# Patient Record
Sex: FEMALE | URBAN - METROPOLITAN AREA
[De-identification: names, ages, dates, MRNs, and addresses within clinical notes are randomized per-mention and may not be internally consistent; named-entity substitution may affect disease eponyms.]

---

## 2022-06-06 ENCOUNTER — HOSPITAL ENCOUNTER (OUTPATIENT)
Dept: PHYSICAL THERAPY | Age: 62
Setting detail: RECURRING SERIES
Discharge: HOME | End: 2022-06-09
Payer: COMMERCIAL

## 2022-06-06 PROCEDURE — 97530 THERAPEUTIC ACTIVITIES: CPT

## 2022-06-06 PROCEDURE — 97162 PT EVAL MOD COMPLEX 30 MIN: CPT

## 2022-06-06 NOTE — PROGRESS NOTES
Samantha Lopez  : 1960  Primary: [facility]  Secondary:  Karin Pedroza [address] [address]  Phone: [phone]  Fax: [phone] Plan Frequency: weekly    Plan of Care/Certification Expiration Date: 22      PT Visit Info: Total # of Visits to Date: 1  Progress Note Due Date: 22 (or 10th visit)      OUTPATIENT PHYSICAL THERAPY:OP NOTE TYPE: Treatment Note 2022       Episode  }Appt Desk              Treatment Diagnosis:   Treatment Diagnosis:  Lack of coordination (muscle incoordination) (R27.8)  Pelvic floor dysfunction in female (M62.98)  Urge incontinence (N39.41)  Muscle spasm (M62.83)  Medical/Referring Diagnosis:  PFD (pelvic floor dysfunction) [S79.88]  Referring Physician:  Margueritte Boas, MD MD Orders:  PT Eval and Treat   Date of Onset:  No data recorded   Allergies:   Patient has no allergy information on record. Restrictions/Precautions:  No data recordedNo data recorded   Interventions Planned (Treatment may consist of any combination of the following):    Current Treatment Recommendations: Strengthening; ROM; Neuromuscular re-education; Manual Therapy - Soft Tissue Mobilization; Pain management; Home exercise program; Therapeutic activities     Subjective Comments:  1. Bladder spasms 2. Urge incontinence 3.  Stress Incontinence  Initial:}     010Post Session: 0/10       /10  Medications Last Reviewed:  2022  Updated Objective Findings:  See evaluation note from today  Treatment   THERAPEUTIC EXERCISE: (0 minutes):      THERAPEUTIC ACTIVITY: (25 minutes): Functional activity education regarding anatomy, pathology and role of pelvic floor muscle (PFM) function in relation to presenting symptoms and role of pelvic floor therapy in conservative treatment., Functional activity education on avoiding bladder irritants, substitutions for common irritants, recommended fluid intake (types and spacing), appropriate voiding frequency, weight management and overall contributing factors of bowel health on bladder health/function in order to improve independent self management. Patient was provided a list of common bladder irritants including caffeine, carbonation, alcohol, artificial sweeteners, chocolate, acidic drinks, and tomato based drinks. and Functional activity training on role and use of urge suppression in order to delay voiding, minimize urge urinary incontinence and improve control in the presence of urge triggers (ie. running water, key in lock, cold, etc.)    TA Educational Topic Notes Date Completed   Pathology/Anatomy/PFM Function Completed 6/6/22   Bladder health education Completed 6/6/22   Urinary urge suppression Completed 6/6/22   The spencer     Voiding strategies     Nocturia tips     Bowel/Bladder log     Bowel health education     Constipation care     Diarrhea/Fecal leakage     Colonic massage     Toilet positioning     Defecation dynamics     Sources of fiber     Return to intercourse/Dilator training     Sexual positioning     Lubricants/vaginal moisturizers     Vaginal irritants     Body mechanics     Posture/ergonomics     Diaphragmatic breathing     Resources and technology         Treatment/Session Summary:    · Treatment Assessment:  Pt verbalized understanding with completion of HEP. · Communication/Consultation:  None today  · Equipment provided today:  None  · Recommendations/Intent for next treatment session: Next visit will focus on hip strength screen, manual therapy to PFM, sEMG biofeedback for urge suppression training.     Total Treatment Billable Duration:  60 minutes   Time In: 0100  Time Out: 0200    Anjum Bingham PT       Charge Capture  }Post Session Pain  MedBridge Portal  MD Guidelines  Scanned Media  Benefits  MyChart    Future Appointments   Date Time Provider Aaron Lees   6/20/2022 10:00 AM Anjum Bingham, [address] [facility]   7/1/2022 10:00 AM Anjum Bingham, PT [facility]   7/12/2022 10:00 AM Leonard Romero [facility]   7/22/2022 10:00 AM Martinez leger PT [facility]   7/29/2022 10:00 AM GEO Romero

## 2022-06-06 NOTE — THERAPY EVALUATION
Waldo Wren  : 1960  Primary: Lacy De La Cruz  Secondary:  Beatriz Macdonald [facility] [address] [place] [address] Way [address]  Phone: [phone]  Fax: [phone] Plan Frequency: weekly    Plan of Care/Certification Expiration Date: 22      PT Visit Info: Total # of Visits to Date: 1      OUTPATIENT PHYSICAL THERAPY:OP NOTE TYPE: Initial Assessment 2022               Episode  Appt Desk         Treatment Diagnosis:  Lack of coordination (muscle incoordination) (R27.8)  Pelvic floor dysfunction in female (M62.98)  Urge incontinence (N39.41)  Muscle spasm (M62.83)  * No diagnoses found *  Medical/Referring Diagnosis:  PFD (pelvic floor dysfunction) [L66.23]  Referring Physician:  Aaron Berumen MD MD Orders:  PT Eval and Treat   Return MD Appt:  2022  Date of Onset:  No data recorded   Allergies:  Patient has no allergy information on record. Restrictions/Precautions:    No data recordedNo data recorded   Medications Last Reviewed:  2022     SUBJECTIVE   History of Injury/Illness (Reason for Referral):  Ms. Alok Kim is a 59 yo female referred to pelvic floor physical therapy (PFPT) by Aaron Berumen MD 2/2 PFD (pelvic floor dysfunction) [M62.89] Pt reports that symptoms began 2 years ago. She has had HAY for several years, but bladder spasms have been more recent. They are painful and last 30-45 seconds. States the spasms return until she empties her bladder. She has been taking Vesicare since the fall which has helped. She has incontinence with sudden urge. This occurs with transitioning from OR to restroom. Frequent UTI's (2-3x/year) since menopause. She has not had a UTI in 1 year. She has not had a  Recent cystoscopy. Her last cystoscopy was 8 years ago without evident findings. 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     She is also taking metformin - She has loose stool. She is not sexually active. L/5 discectomy. Has felt sensory loss following. Urinary: Frequency 9-12 x/day, 2 x/night. She is trying to go every 2 hours. Positive for urge incontinence> stress incontinence. She used to experience bladder spasms all day. They are less frequent with medication. She has some relief with urination. Denies difficulty emptying her bladder. Pt does use pads for urinary protection; 2 pads per day (PPD). Fluid intake: 2 16 oz glasses of water/day; bladder irritants include: decaf coffee, white wine Pt does limit fluid intake due to bladder control while at work. Bowel: Frequency daily  Positive for diarrhea with medication. Negative for pain with bowel movement and pushing/straining with bowel movement. Pt does not use pads for bowel protection; 0 pads per day (PPD). Sexual: Pt is not sexually active. Her  is impotent. Pelvic Organ Prolapse/Pelvic Pain: Denies pelvic pain. OB History: Number of pregnancies: Number of vaginal deliveries:  0Number of c-sections: 1 (twins)    Hemorrhoidectomy and fissure repair     Patient Stated Goal(s):  Minimize UI and bladder spasms. Initial:      /10 Post Session:      /10  Past Medical History/Comorbidities:   Ms. Macrina Padgett  has no past medical history on file. Ms. Macrina Padgett  has no past surgical history on file. Social History/Living Environment:   Lives With: Spouse     Prior Level of Function/Work/Activity:   No data recordedNo data recordedNo data recorded   Learning:   Does the patient/guardian have any barriers to learning?: No barriers     Fall Risk Scale: Total Score: 0  Leach Fall Risk: Low (0-24)        Previous Treatment Approaches:  OAB medication    Personal Factors:        Age:  58 y.o. Profession:  Works in [employer]. Pt working 4 ten hour shifts per week. She walks for exercises at work. She is working on weight loss by counting calories.  She recently had a knee injection (seeing Dr. Fabricio Trimble)      OBJECTIVE External Observations:   Voluntary contraction: [] absent     [x] present   Involuntary contraction: [] absent     [x] present   Involuntary relaxation: [] absent     [x] present   Perineal descent at rest: [x] absent     [] present   Perineal descent with bearing: [] absent     [x] present   Postural assessment: Forward Head Posture and Increased Thoracic Kyphosis   Gait: Ambulating with cane. Pelvic Floor Muscle (PFM) Assessment:   Vaginal vault size: [] decreased     [] increased     [x] WNL   Muscle volume: [] decreased     [] WNL     [x] Defect   PFM tension: [] decreased     [x] increased     [] WNL    Contraction ability:  Voluntary contraction: [] absent     [x] weak     [] moderate      [] strong  Voluntary relaxation: [] absent     [x] partial     [] complete   MMT: 3/5   Number of quick contractions in 10 seconds: 5  Quality of contractions: Fair to good  Overflow: [] absent     [] min     [] mod     [] severe / Compensatory mm groups include breath holding    Palpation: via vaginal canal   Superficial Pelvic Floor Musculature (PFM): Tender? Intermediate PFM Tender? Deep PFM Tender? Superficial Transverse Perineal [x] Right  [x] Left  []DNT Deep Transverse Perineal [] Right  [] Left  []DNT Puborectalis [] Right  [] Left  []DNT   Ischiocavernosus [x] Right  [x] Left  []DNT  Referral to bladder Compressor Urethra [x] Right  [x] Left  []DNT  Referral to bladder Pubococcygeus [x] Right  [x] Left  []DNT   Bulbocavernosus [x] Right  [x] Left  []DNT   Iliococcygeus [x] Right  [x] Left  []DNT       Obturator Internus [] Right  [] Left  []DNT       Coccygeus [] Right  [] Left  []DNT     Strength: LE To be assessed. Range of motion:   Left Right   Hip flexion     Hip extension     Knee flexion      Knee extension     Hip internal rotation      Hip external rotation      Hip abduction     Hip adduction          External palpation:  Muscle/muscle group Tender?    Adductors [] Right  [] Left []DNT   Gluteals [] Right  [] Left  []DNT   Piriformis [] Right  [] Left  []DNT   Iliopsoas [] Right  [] Left  []DNT   Abdominal wall [x] Right  [x] Left  []DNT   Pubic symphysis [] Right  [] Left  []DNT     Breath assessment:  Observation: chest breathing dominant  Coordination of pelvic floor muscles with breath: no pelvic floor muscle excursion  Co-contraction of PFM with transversus abdominis: present    Diastasis Recti    At umbilicus Present   1 inch above umbilicus    1 inch below umbilicus    TA contraction        ASSESSMENT   Initial Assessment:  Emil Pink presents with musculoskeletal limitations including pelvic floor muscle (PFM) tension, reduced PFM Range of Motion (ROM), increased tenderness to palpation of the PFM, altered body mechanics, reduced coordination and awareness of PFM and decreased pelvic floor muscle (PFM) strength. These limitations are impairing the patient's ability to properly coordinate perineal elevation and descent for normalized PFM function, contributing to urinary dysfunction. As a result, she is limited in her/his ability to participate in physical activities such as walking, swimming, or other exercise, traveling by car or bus for a distance greater then 30 minutes away from home and perform job. Problem List: (Impacting functional limitations): Body Structures, Functions, Activity Limitations Requiring Skilled Therapeutic Intervention: Decreased ROM; Decreased body mechanics; Decreased tolerance to work activity; Decreased strength;  Increased pain     Therapy Prognosis:   Therapy Prognosis: Good; Fair     Assessment Complexity:   Medium Complexity  PLAN   Effective Dates: 09/04/22 TO Plan of Care/Certification Expiration Date: 09/04/22     Frequency/Duration: Plan Frequency: weekly     Interventions Planned (Treatment may consist of any combination of the following):    Current Treatment Recommendations: Strengthening; ROM; Neuromuscular re-education; Manual Therapy - Soft Tissue Mobilization; Pain management; Home exercise program; Therapeutic activities     Goals: (Goals have been discussed and agreed upon with patient.)  Short-Term Functional Goals: Time Frame: 3-4 weeks  Pt will demonstrate I with basic PFM HEP to improve awareness, coordination, and timing of PFM. Patient will demonstrate understanding of and ability to teach back appropriate water intake, bladder irritants, toileting frequency, and positioning for improved self-management of symptoms. Patient will demonstrate ability to isolate a pelvic floor contraction without breath holding and minimal to no accessory muscle use in order to implement the knack and/or urge suppression, reducing pad usage by 1/2. Patient will demonstrate appropriate use of the pelvic floor muscle group (quick flicks and/or drops), without compensation, to implement urge suppression appropriately with urgency of urination and decrease the number of pads per day or UI episodes by 1/2. Patient will demonstrate appropriate co-contraction of the transversus abdominis and pelvic floor muscle group during exhalation in order to reduce IAP and improve functional task performance including lifting, bending, transitioning. Discharge Goals: Time Frame: 4-8 weeks  Patient will demonstrate ability to voluntarily contract the pelvic floor muscles prior to a cough or sneeze for decreased leakage during increases in intra-abdominal pressure. Patient will demonstrate independence with an advanced HEP for general conditioning, core stabilization, and mobility to facilitate carry over and independent management of symptoms. Patient will be independent with implementation of a timed voiding schedule and use of urge suppression to reduce urinary frequency to 8-10/day and 1-2/night.            Outcome Measure:   PFIQ: 24/100    Medical Necessity:   Patient is expected to demonstrate progress in strength, range of motion and coordination to improve urinary control. Skilled intervention continues to be required due to the above mentioned deficits. Reason For Services/Other Comments:  Patient continues to require skilled intervention due to the above mentioned deficits. Total Duration:   60 minutes    Regarding James Peace's therapy, I certify that the treatment plan above will be carried out by a therapist or under their direction.   Thank you for this referral,  Salli Brittle, PT     Referring Physician Signature: Margueritte Boas, MD _______________________________ Date _____________        Post Session Pain  Charge Capture   POC Link  Treatment Note Link  MD Guidelines  MyChart

## 2022-06-20 ENCOUNTER — HOSPITAL ENCOUNTER (OUTPATIENT)
Dept: PHYSICAL THERAPY | Age: 62
Setting detail: RECURRING SERIES
Discharge: HOME | End: 2022-06-23
Payer: COMMERCIAL

## 2022-06-20 PROCEDURE — 97530 THERAPEUTIC ACTIVITIES: CPT

## 2022-06-20 PROCEDURE — 97140 MANUAL THERAPY 1/> REGIONS: CPT

## 2022-06-20 NOTE — PROGRESS NOTES
Aram Chi  : 1960  Primary:   Secondary:  Domo Garcia  [address] [facility] [address]  Phone: [phone]  Fax: [phone] Plan Frequency: weekly    Plan of Care/Certification Expiration Date: 22      PT Visit Info: Total # of Visits to Date: 2  Progress Note Due Date: 22 (or 10th visit)      OUTPATIENT PHYSICAL THERAPY:OP NOTE TYPE: Treatment Note 2022       Episode  }Appt Desk              Treatment Diagnosis:  Lack of coordination (muscle incoordination) (R27.8)  Pelvic floor dysfunction in female (M62.98)  Urge incontinence (N39.41)  Muscle spasm (M62.83)  Medical/Referring Diagnosis:  PFD (pelvic floor dysfunction) [E08.40]  Referring Physician:  Roland Mcgovern MD MD Orders:  PT Eval and Treat   Date of Onset:  No data recorded   Allergies:   Patient has no allergy information on record. Restrictions/Precautions:  No data recordedNo data recorded   Interventions Planned (Treatment may consist of any combination of the following):    Current Treatment Recommendations: Strengthening; ROM; Neuromuscular re-education; Manual Therapy - Soft Tissue Mobilization; Pain management; Home exercise program; Therapeutic activities     Subjective Comments:  Pt is trying to figure out a OAB medication that will be covered by her insurance. Taking vesicare currently. She states bladder spasms are less. She is uanable to make it to the restroom in time when she does have to urinate. Initial:}     0/10Post Session: 0/10       0/10  Medications Last Reviewed:  2022  Updated Objective Findings:    Limited right  Hip internal rotation due to pain present. Right hip external rotation range of motion limited. Unable to perform MMT due to pain present. Left hip ROM also limited. Left hip ER 3+/5. Unable to test internal rotation due to pain. PFM overactivity present.  Intermittent paradoxical PFM contraction present, mild difficulty coordinating quick flicks. Treatment   THERAPEUTIC EXERCISE: (0 minutes):      THERAPEUTIC ACTIVITY: (25 minutes): Functional activity education regarding anatomy, pathology and role of pelvic floor muscle (PFM) function in relation to presenting symptoms and role of pelvic floor therapy in conservative treatment., Functional activity education on avoiding bladder irritants, substitutions for common irritants, recommended fluid intake (types and spacing), appropriate voiding frequency, weight management and overall contributing factors of bowel health on bladder health/function in order to improve independent self management. Patient was provided a list of common bladder irritants including caffeine, carbonation, alcohol, artificial sweeteners, chocolate, acidic drinks, and tomato based drinks. and Functional activity training on role and use of urge suppression in order to delay voiding, minimize urge urinary incontinence and improve control in the presence of urge triggers (ie. running water, key in lock, cold, etc.)    TA Educational Topic Notes Date Completed   Pathology/Anatomy/PFM Function Completed 6/6/22   Bladder health education Completed 6/6/22   Urinary urge suppression Completed  Reviewed - provided handout  Updated HEP to include PF drops and quick flicks 4/5/08 7/83/62   The knack     Voiding strategies     Nocturia tips     Bowel/Bladder log     Bowel health education     Constipation care     Diarrhea/Fecal leakage     Colonic massage     Toilet positioning     Defecation dynamics     Sources of fiber     Return to intercourse/Dilator training     Sexual positioning     Lubricants/vaginal moisturizers     Vaginal irritants     Body mechanics     Posture/ergonomics     Diaphragmatic breathing Reviewed, drops with digital cues 6/20/22   Resources and technology       MANUAL THERAPY: (30 minutes):    Soft tissue mobilization was utilized and necessary because of the patient's restricted motion of soft tissue. Date Type Location Comments   6/20/2022 Internal assessment/treatment Via vaginal canal SP, digital sweeping, and C/R - applied to superficial, intermediate, and deep layers of PFM                                       (Used abbreviations: MET - muscle energy technique; SCS- Strain counter strain; CTM-Connective tissue mobilizations; CR- Contract/Relax; SP- Sustained pressure; PIT- Positional inhibition techniques; STM Soft -tissue mobilization; MM- Myofascial mobilization; TrP-Trigger point release; IASTM- Instrument assisted soft tissue mobilizations, TDN-Trigger point dry needling)    Pt gives verbal consent to internal vaginal assessment/treatment without chaperon present. Treatment/Session Summary:    · Treatment Assessment:  Pt continues to present with excessive PFM tension contributing to increased bladder pressure and discomfort. This is also likely limiting her ability to perform quick flick PFM contractions as needed for urge supression. Verbal cues provided for appropriate coordination of PFM during contraction due to tendency to inhale and kegal.  · Communication/Consultation:  None today  · Equipment provided today:  None  · Recommendations/Intent for next treatment session: Next visit will focus on hip strengthening (initiate with isometrics into abduction), continue manual therapy to PFM, sEMG biofeedback for urge suppression training.     Total Treatment Billable Duration:  60 minutes   Time In: 1005  Time Out: [address] [facility], PT       Charge Capture  }Post Session Pain  MedBridge Portal  MD Guidelines  Scanned Media  Benefits  MyChart    Future Appointments   Date Time Provider Aaron Lees   7/1/2022 10:00 AM Martinez leger PT [facility]   7/12/2022 10:00 AM Martinez leger PT [facility]   7/22/2022 10:00 AM Martinez leger PT [facility]   7/29/2022 10:00 AM GEO Romero

## 2022-07-01 ENCOUNTER — HOSPITAL ENCOUNTER (OUTPATIENT)
Dept: PHYSICAL THERAPY | Age: 62
Setting detail: RECURRING SERIES
Discharge: HOME | End: 2022-07-04
Payer: COMMERCIAL

## 2022-07-01 PROCEDURE — 97110 THERAPEUTIC EXERCISES: CPT

## 2022-07-01 PROCEDURE — 97140 MANUAL THERAPY 1/> REGIONS: CPT

## 2022-07-01 PROCEDURE — 97530 THERAPEUTIC ACTIVITIES: CPT

## 2022-07-01 NOTE — PROGRESS NOTES
of quick flicks and drops. PFM contraction: 3/5 followed by full relaxation    Treatment   THERAPEUTIC EXERCISE: (10 minutes):    Supine hip abduction - isometric x 5 sec hold x 10 reps (RTB)  Isometric hip adduction with ball squeeze - x 10, 2 sets      THERAPEUTIC ACTIVITY: (15 minutes): Functional activity education regarding anatomy, pathology and role of pelvic floor muscle (PFM) function in relation to presenting symptoms and role of pelvic floor therapy in conservative treatment., Functional activity education on avoiding bladder irritants, substitutions for common irritants, recommended fluid intake (types and spacing), appropriate voiding frequency, weight management and overall contributing factors of bowel health on bladder health/function in order to improve independent self management. Patient was provided a list of common bladder irritants including caffeine, carbonation, alcohol, artificial sweeteners, chocolate, acidic drinks, and tomato based drinks.  and Functional activity training on role and use of urge suppression in order to delay voiding, minimize urge urinary incontinence and improve control in the presence of urge triggers (ie. running water, key in lock, cold, etc.)    TA Educational Topic Notes Date Completed   Pathology/Anatomy/PFM Function Completed 6/6/22   Bladder health education Completed 6/6/22   Urinary urge suppression Completed  Reviewed - provided handout  Updated HEP to include PF drops and quick flicks  Reviewed 0/6/22 6/20/22 7/1/22   The knack     Voiding strategies     Nocturia tips     Bowel/Bladder log     Bowel health education     Constipation care     Diarrhea/Fecal leakage     Colonic massage     Toilet positioning     Defecation dynamics     Sources of fiber     Return to intercourse/Dilator training     Sexual positioning     Lubricants/vaginal moisturizers     Vaginal irritants     Body mechanics     Posture/ergonomics     Diaphragmatic breathing Reviewed, drops with digital cues 6/20/22   Resources and technology       MANUAL THERAPY: (30 minutes): Soft tissue mobilization was utilized and necessary because of the patient's restricted motion of soft tissue. Date Type Location Comments   7/1/2022 Internal assessment/treatment Via vaginal canal SP, digital sweeping, and C/R - applied to superficial, intermediate, and deep layers of PFM     adductors CTM through adductors                                 (Used abbreviations: MET - muscle energy technique; SCS- Strain counter strain; CTM-Connective tissue mobilizations; CR- Contract/Relax; SP- Sustained pressure; PIT- Positional inhibition techniques; STM Soft -tissue mobilization; MM- Myofascial mobilization; TrP-Trigger point release; IASTM- Instrument assisted soft tissue mobilizations, TDN-Trigger point dry needling)    Pt gives verbal consent to internal vaginal assessment/treatment without chaperon present. Treatment/Session Summary:    · Treatment Assessment: Pt showed improved coordination of PFM today. Pt able to demonstrate quick flicks and drops without verbal cues and is utilizing this for urge suppression during her workday. Initiated hip isometrics with light resistance. · Communication/Consultation:  None today  · Equipment provided today:  None  · Recommendations/Intent for next treatment session: Next visit will focus on hip strengthening (assess response to isometrics into abduction and adduction), continue manual therapy to PFM, sEMG biofeedback for urge suppression training.     Total Treatment Billable Duration:  55 minutes   Time In: 5117  Time Out: 600 [facility], PT       Charge Capture  }Post Session Pain  MedBridge Portal  MD Guidelines  Scanned Media  Benefits  MyChart    Future Appointments   Date Time Provider Aaron Lees   7/12/2022 10:00 AM Tin Frederick PT [facility]   7/22/2022 10:00 AM Tin Frederick PT [facility]   7/29/2022 10:00 AM Tin Frederick PT [facility]

## 2022-07-12 ENCOUNTER — HOSPITAL ENCOUNTER (OUTPATIENT)
Dept: PHYSICAL THERAPY | Age: 62
Setting detail: RECURRING SERIES
Discharge: HOME | End: 2022-07-15
Payer: COMMERCIAL

## 2022-07-12 PROCEDURE — 97140 MANUAL THERAPY 1/> REGIONS: CPT

## 2022-07-12 PROCEDURE — 97110 THERAPEUTIC EXERCISES: CPT

## 2022-07-12 NOTE — PROGRESS NOTES
Luanne Alvarez  : 1960  Primary:   Secondary:  Zigmund Mandaeism  [address] [facility] [address]  Phone: [phone]  Fax: [phone] Plan Frequency: weekly    Plan of Care/Certification Expiration Date: 22      PT Visit Info: Total # of Visits to Date: 4  Progress Note Due Date: 22 (or 10th visit)      OUTPATIENT PHYSICAL THERAPY:OP NOTE TYPE: Treatment Note 2022       Episode  }Appt Desk              Treatment Diagnosis:  Lack of coordination (muscle incoordination) (R27.8)  Pelvic floor dysfunction in female (M62.98)  Urge incontinence (N39.41)  Muscle spasm (M62.83)  Medical/Referring Diagnosis:  PFD (pelvic floor dysfunction) [G32.21]  Referring Physician:  Bushra Tim MD MD Orders:  PT Eval and Treat   Date of Onset:  No data recorded   Allergies:   Patient has no allergy information on record. Restrictions/Precautions:  No data recordedNo data recorded   Interventions Planned (Treatment may consist of any combination of the following):    Current Treatment Recommendations: Strengthening; ROM; Neuromuscular re-education; Manual Therapy - Soft Tissue Mobilization; Pain management; Home exercise program; Therapeutic activities     Subjective Comments:    Pt started new bladder medication today. She has her appointment with Dr. Leigha Arana. She was off of work for 5 days and then returned. She had an increase in bladder spasms for 3-4 days when she returned back to work. She admits to drinking wine over her days off. Pt reports with side-to-side transitional movement - out of car and transitioning off commode she has significant right hip pain. Initial:}     4/10Post Session: 0/10       0/10  Medications Last Reviewed:  2022  Updated Objective Findings:    Limited right  Hip internal rotation due to pain present. Right hip external rotation range of motion limited. Unable to perform MMT due to pain present.    Left hip ROM also limited. Left hip ER 3+/5. Unable to test internal rotation due to pain. PFM contraction: 3/5 followed by full relaxation    Treatment   THERAPEUTIC EXERCISE: (15 minutes):    Supine hip abduction - isometric x 5 sec hold x 20 reps (RTB)  Isometric hip adduction with ball squeeze - x 10, 2 sets  Bridge x 10      THERAPEUTIC ACTIVITY: ( minutes): Functional activity education regarding anatomy, pathology and role of pelvic floor muscle (PFM) function in relation to presenting symptoms and role of pelvic floor therapy in conservative treatment., Functional activity education on avoiding bladder irritants, substitutions for common irritants, recommended fluid intake (types and spacing), appropriate voiding frequency, weight management and overall contributing factors of bowel health on bladder health/function in order to improve independent self management. Patient was provided a list of common bladder irritants including caffeine, carbonation, alcohol, artificial sweeteners, chocolate, acidic drinks, and tomato based drinks.  and Functional activity training on role and use of urge suppression in order to delay voiding, minimize urge urinary incontinence and improve control in the presence of urge triggers (ie. running water, key in lock, cold, etc.)    TA Educational Topic Notes Date Completed   Pathology/Anatomy/PFM Function Completed 6/6/22   Bladder health education Completed 6/6/22   Urinary urge suppression Completed  Reviewed - provided handout  Updated HEP to include PF drops and quick flicks  Reviewed 7/5/89 6/20/22 7/1/22   The knack     Voiding strategies     Nocturia tips     Bowel/Bladder log     Bowel health education     Constipation care     Diarrhea/Fecal leakage     Colonic massage     Toilet positioning     Defecation dynamics     Sources of fiber     Return to intercourse/Dilator training     Sexual positioning     Lubricants/vaginal moisturizers     Vaginal irritants     Body mechanics Posture/ergonomics     Diaphragmatic breathing Reviewed, drops with digital cues 6/20/22   Resources and technology       MANUAL THERAPY: (40 minutes): Soft tissue mobilization was utilized and necessary because of the patient's restricted motion of soft tissue. Date Type Location Comments   7/12/2022 Internal assessment/treatment Via vaginal canal SP, digital sweeping, and C/R - applied to superficial, intermediate, and deep layers of PFM     adductors CTM through adductors      CTM through abdominal wall                           (Used abbreviations: MET - muscle energy technique; SCS- Strain counter strain; CTM-Connective tissue mobilizations; CR- Contract/Relax; SP- Sustained pressure; PIT- Positional inhibition techniques; STM Soft -tissue mobilization; MM- Myofascial mobilization; TrP-Trigger point release; IASTM- Instrument assisted soft tissue mobilizations, TDN-Trigger point dry needling)    Pt gives verbal consent to internal vaginal assessment/treatment without chaperon present. Treatment/Session Summary:    · Treatment Assessment: Pt continues to present with PFM overactivity. Trigger points present through suprapubic fascia. Mild discomfort present, but without referral to bladder. · Communication/Consultation:  None today  · Equipment provided today:  None  · Recommendations/Intent for next treatment session: Next visit will focus on hip strengthening (assess response to isometrics into abduction and adduction), continue manual therapy to PFM, sEMG biofeedback for urge suppression training.   · Variation from POC: Pt will be out of town June 29th - August 16th (in [place])    Total Treatment Billable Duration:  55 minutes   Time In: 1005  Time Out: 600 [facility], PT       Charge Capture  }Post Session Pain  MedBridge Portal  MD Guidelines  Scanned Media  Benefits  MyChart    Future Appointments   Date Time Provider Aaron Lees   7/22/2022 10:00 AM Brendon Hidalgo PT [facility] 7/29/2022  7:00 PM Ayla Tristan, PT [facility]

## 2022-07-22 ENCOUNTER — HOSPITAL ENCOUNTER (OUTPATIENT)
Dept: PHYSICAL THERAPY | Age: 62
Setting detail: RECURRING SERIES
Discharge: HOME | End: 2022-07-25
Payer: COMMERCIAL

## 2022-07-22 PROCEDURE — 97140 MANUAL THERAPY 1/> REGIONS: CPT

## 2022-07-22 PROCEDURE — 97110 THERAPEUTIC EXERCISES: CPT

## 2022-07-29 ENCOUNTER — HOSPITAL ENCOUNTER (OUTPATIENT)
Dept: PHYSICAL THERAPY | Age: 62
Setting detail: RECURRING SERIES
End: 2022-07-29
Payer: COMMERCIAL

## 2022-07-29 NOTE — PROGRESS NOTES
Israel Frazier  : 1960  Primary: Sandra Jones - Open Access (hmo)  Secondary:  [facility] [address] [address]  Phone: [phone]  Fax: [phone]    PT Visit Info: Total # of Visits to Date: 5  Progress Note Due Date: 22 (or 10th visit)     OT Visit Info:  No data recorded    OUTPATIENT THERAPY:OP NOTE TYPE: Progress Report 2022               Episode  Appt Desk           Israel Frazier cancelled her appointment for today due to unknown reasons. Will plan to follow up next during next appointment.   Thank you,  Tin Frederick, PT    Future Appointments   Date Time Provider Aaron Lees   2022  7:00 PM Tin Frederick, [facility]   8/15/2022 10:00 AM Tin Frederick PT [facility]   2022 10:00 AM Tin Frederick PT [facility]

## 2022-08-15 ENCOUNTER — HOSPITAL ENCOUNTER (OUTPATIENT)
Dept: PHYSICAL THERAPY | Age: 62
Setting detail: RECURRING SERIES
Discharge: HOME | End: 2022-08-18
Payer: COMMERCIAL

## 2022-08-15 PROCEDURE — 97140 MANUAL THERAPY 1/> REGIONS: CPT

## 2022-08-15 PROCEDURE — 97530 THERAPEUTIC ACTIVITIES: CPT

## 2022-08-15 NOTE — PROGRESS NOTES
Ramiro Bustillo  : 1960  Primary:   Secondary:  Rohit Peak  [address] [facility] [address]  Phone: [phone]  Fax: [phone] Plan Frequency: weekly    Plan of Care/Certification Expiration Date: 22      PT Visit Info: Total # of Visits to Date: 6  Progress Note Due Date: 22 (or 10th visit)      OUTPATIENT PHYSICAL THERAPY:OP NOTE TYPE: Treatment Note 8/15/2022       Episode  }Appt Desk              Treatment Diagnosis:  Lack of coordination (muscle incoordination) (R27.8)  Pelvic floor dysfunction in female (M62.98)  Urge incontinence (N39.41)  Muscle spasm (M62.83)  Medical/Referring Diagnosis:  PFD (pelvic floor dysfunction) [N88.16]  Referring Physician:  Negro Morataya MD MD Orders:  PT Eval and Treat   Date of Onset:  Onset Date:  (2 years ago)     Allergies:   Patient has no allergy information on record. Restrictions/Precautions:  No data recordedNo data recorded   Interventions Planned (Treatment may consist of any combination of the following):    Current Treatment Recommendations: Strengthening; ROM; Neuromuscular re-education; Manual Therapy - Soft Tissue Mobilization; Pain management; Home exercise program; Therapeutic activities     Subjective Comments:  Pt just returned from being out of town for 2 weeks. Pt reports incontinence during the evenings. She states bladder spasms are very rare. She continues to work on relaxing her muscles during spasm onset. Today her BM's have been irritable. Initial:}     0/10Post Session: 0/10       0/10  Medications Last Reviewed:  8/15/2022  Updated Objective Findings:    Limited right  Hip internal rotation due to pain present. Right hip external rotation range of motion limited. Unable to perform MMT due to pain present. Left hip ROM also limited. Left hip ER 3+/5. Unable to test internal rotation due to pain.  1    PFM contraction: 3/5 followed by mild delay in relaxation  Mild to moderate tension present through levator ani muscles. Referral to bladder with palpation of levator ani muscles remains. Treatment   THERAPEUTIC EXERCISE: ( minutes):    Supine hip abduction - isometric x 5 sec hold x 20 reps (RTB)  Isometric hip adduction with ball squeeze - x 10, 2 sets  Bridge x 10    Nu step x 6 minutes for hip ROM. Review of HEP - progression to aerobic physical activity including use of nu-step and recumbent bike. THERAPEUTIC ACTIVITY: (10 minutes): Functional activity education regarding anatomy, pathology and role of pelvic floor muscle (PFM) function in relation to presenting symptoms and role of pelvic floor therapy in conservative treatment., Functional activity education on avoiding bladder irritants, substitutions for common irritants, recommended fluid intake (types and spacing), appropriate voiding frequency, weight management and overall contributing factors of bowel health on bladder health/function in order to improve independent self management. Patient was provided a list of common bladder irritants including caffeine, carbonation, alcohol, artificial sweeteners, chocolate, acidic drinks, and tomato based drinks.  and Functional activity training on role and use of urge suppression in order to delay voiding, minimize urge urinary incontinence and improve control in the presence of urge triggers (ie. running water, key in lock, cold, etc.)    TA Educational Topic Notes Date Completed   Pathology/Anatomy/PFM Function Completed 6/6/22   Bladder health education Completed 6/6/22   Urinary urge suppression Completed  Reviewed - provided handout  Updated HEP to include PF drops and quick flicks  Reviewed 6/4/40  6/20/22  7/1/22  7/22/22  8/15/22   The knack     Voiding strategies     Nocturia tips     Bowel/Bladder log     Bowel health education     Constipation care     Diarrhea/Fecal leakage     Colonic massage     Toilet positioning     Defecation dynamics     Sources of fiber     Return to intercourse/Dilator training     Sexual positioning     Lubricants/vaginal moisturizers     Vaginal irritants     Body mechanics     Posture/ergonomics     Diaphragmatic breathing Reviewed, drops with digital cues 6/20/22   Resources and technology       MANUAL THERAPY: (40 minutes): Soft tissue mobilization was utilized and necessary because of the patient's restricted motion of soft tissue. Date Type Location Comments   8/15/2022 Internal assessment/treatment Via vaginal canal SP, digital sweeping, and C/R - applied to superficial, intermediate, and deep layers of PFM     adductors CTM through adductors      CTM through abdominal wall, suprapubic/mons pubis                           (Used abbreviations: MET - muscle energy technique; SCS- Strain counter strain; CTM-Connective tissue mobilizations; CR- Contract/Relax; SP- Sustained pressure; PIT- Positional inhibition techniques; STM Soft -tissue mobilization; MM- Myofascial mobilization; TrP-Trigger point release; IASTM- Instrument assisted soft tissue mobilizations, TDN-Trigger point dry needling)    Pt gives verbal consent to internal vaginal assessment/treatment without chaperon present. Treatment/Session Summary:    Treatment Assessment: Pt continues to present with PFM overactivity through her levator ani muscles. Referral to bladder with palpation of levator ani muscles remains. She does show improved ability to identify and coordinate lengthening to relax her PFM in session and with onset of bladder spasms. Communication/Consultation:  None today  Equipment provided today:  None  Recommendations/Intent for next treatment session: Next visit will focus on hip strengthening (assess response to isometrics into abduction and adduction), continue manual therapy to PFM, sEMG biofeedback for urge suppression training.       Total Treatment Billable Duration:  50 minutes   Time In: 1005  Time Out: 600 [facility], PT       Charge Capture  }Post Session Pain  MedBridge Portal  MD Guidelines  Scanned Media  Benefits  MyChart    Future Appointments   Date Time Provider Aaron Lees   8/23/2022 10:00 AM Luke Douglas PT [facility]

## 2022-08-23 ENCOUNTER — HOSPITAL ENCOUNTER (OUTPATIENT)
Dept: PHYSICAL THERAPY | Age: 62
Setting detail: RECURRING SERIES
Discharge: HOME | End: 2022-08-26
Payer: COMMERCIAL

## 2022-08-23 PROCEDURE — 97110 THERAPEUTIC EXERCISES: CPT

## 2022-08-23 PROCEDURE — 97140 MANUAL THERAPY 1/> REGIONS: CPT

## 2022-08-23 NOTE — PROGRESS NOTES
Ramila Alas  : 1960  Primary:   Secondary:  Kade Chino  [address] [facility] [address]  Phone: [phone]  Fax: [phone] Plan Frequency: weekly    Plan of Care/Certification Expiration Date: 22      PT Visit Info: Total # of Visits to Date: 7  Progress Note Due Date: 22 (or 10th visit)      OUTPATIENT PHYSICAL THERAPY:OP NOTE TYPE: Treatment Note 2022       Episode  }Appt Desk              Treatment Diagnosis:  Lack of coordination (muscle incoordination) (R27.8)  Pelvic floor dysfunction in female (M62.98)  Urge incontinence (N39.41)  Muscle spasm (M62.83)  Medical/Referring Diagnosis:  PFD (pelvic floor dysfunction) [A61.14]  Referring Physician:  Precious Azevedo MD MD Orders:  PT Eval and Treat   Date of Onset:  Onset Date:  (2 years ago)     Allergies:   Patient has no allergy information on record. 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 She is not having bladder spasms as frequently. When her bladder does get full, she leaks. Urinary frequency at night - ever 4 hours. Daytime - 2.5 - 4 hours. 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 Pads per day: #4 pad at work, at most 2 pads. 6 am - 6 pm Leakage daily. Initial:}     0/10Post Session: 0/10       0/10  Medications Last Reviewed:  2022  Updated Objective Findings:      PFM contraction: 3/5 TrA/PFM  Mild to moderate tension present through levator ani muscles.    Pt denies pain with palpation of her PFM    Treatment   THERAPEUTIC EXERCISE: (25 minutes):    Supine hip abduction - isometric x 5 sec hold x 20 reps (BlueTB)  Isometric hip adduction with ball squeeze - x 10, 2 sets  Bridge x 10  Coordinated PFM contractions: with digital cues, x 15 (PFM + TrA)    THERAPEUTIC ACTIVITY: (minutes): Functional activity education regarding anatomy, pathology and role of pelvic floor muscle (PFM) function in relation to presenting symptoms and role of pelvic floor therapy in conservative treatment., Functional activity education on avoiding bladder irritants, substitutions for common irritants, recommended fluid intake (types and spacing), appropriate voiding frequency, weight management and overall contributing factors of bowel health on bladder health/function in order to improve independent self management. Patient was provided a list of common bladder irritants including caffeine, carbonation, alcohol, artificial sweeteners, chocolate, acidic drinks, and tomato based drinks.  and Functional activity training on role and use of urge suppression in order to delay voiding, minimize urge urinary incontinence and improve control in the presence of urge triggers (ie. running water, key in lock, cold, etc.)    TA Educational Topic Notes Date Completed   Pathology/Anatomy/PFM Function Completed 6/6/22   Bladder health education Completed 6/6/22   Urinary urge suppression Completed  Reviewed - provided handout  Updated HEP to include PF drops and quick flicks  Reviewed 7/3/14  6/20/22  7/1/22  7/22/22  8/15/22   The knack     Voiding strategies     Nocturia tips     Bowel/Bladder log     Bowel health education     Constipation care     Diarrhea/Fecal leakage     Colonic massage     Toilet positioning     Defecation dynamics     Sources of fiber     Return to intercourse/Dilator training     Sexual positioning     Lubricants/vaginal moisturizers     Vaginal irritants     Body mechanics     Posture/ergonomics     Diaphragmatic breathing Reviewed, drops with digital cues 6/20/22   Resources and technology       MANUAL THERAPY: (30 minutes): Soft tissue mobilization was utilized and necessary because of the patient's restricted motion of soft tissue. Date Type Location Comments   8/23/2022 Internal assessment/treatment Via vaginal canal SP, digital sweeping, and C/R - applied to superficial, intermediate, and deep layers of PFM     adductors CTM through adductors - held today      CTM through abdominal wall, suprapubic/mons pubis                           (Used abbreviations: MET - muscle energy technique; SCS- Strain counter strain; CTM-Connective tissue mobilizations; CR- Contract/Relax; SP- Sustained pressure; PIT- Positional inhibition techniques; STM Soft -tissue mobilization; MM- Myofascial mobilization; TrP-Trigger point release; IASTM- Instrument assisted soft tissue mobilizations, TDN-Trigger point dry needling)    Pt gives verbal consent to internal vaginal assessment/treatment without chaperon present. Treatment/Session Summary:    Treatment Assessment: Pt with continued PFM overactivity present. Reduced pain to internal palpation of her PFM. Improved PFM contractility with cues to activate TrA following PFM. Pt reported sensation of lift in her PFM with this. Communication/Consultation:  None today  Equipment provided today:  None  Recommendations/Intent for next treatment session: Next visit will continue manual therapy and global hip strengthening to assist in PFM support.        Total Treatment Billable Duration:  55 minutes   Time In: 4992  Time Out: 600 [facility], PT       Charge Capture  }Post Session Pain  MedBridge Portal  MD Guidelines  Scanned Media  Benefits  MyChart    Future Appointments   Date Time Provider Aaron Lees   9/2/2022  8:00 AM [facility], PT [facility]   9/13/2022 10:00 [facility]   9/19/2022 10:00 AM [facility], PT [facility]   9/30/2022 10:00 AM [facility], PT G[facility] CAMPOSE

## 2022-09-02 ENCOUNTER — HOSPITAL ENCOUNTER (OUTPATIENT)
Dept: PHYSICAL THERAPY | Age: 62
Setting detail: RECURRING SERIES
Discharge: HOME | End: 2022-09-05
Payer: COMMERCIAL

## 2022-09-02 PROCEDURE — 97140 MANUAL THERAPY 1/> REGIONS: CPT

## 2022-09-02 PROCEDURE — 97110 THERAPEUTIC EXERCISES: CPT

## 2022-09-02 NOTE — THERAPY RECERTIFICATION
Sai Haile  : 1960  Primary: Sue Everett - Open Access (hmo)  Secondary:  Lisbeth Posada [facility] [address]  [address] [place] [address] Way [address]  Phone: [phone]  Fax: [phone] Plan Frequency: weekly    Plan of Care/Certification Expiration Date: 22      PT Visit Info: Total # of Visits to Date: 8  Progress Note Due Date: 22 (or 10th visit)      OUTPATIENT PHYSICAL THERAPY:OP NOTE TYPE: Recertification 903               Episode  Appt Desk         Treatment Diagnosis:  Lack of coordination (muscle incoordination) (R27.8)  Pelvic floor dysfunction in female (M62.98)  Urge incontinence (N39.41)  Muscle spasm (M62.83)  * No diagnoses found *  Medical/Referring Diagnosis:  PFD (pelvic floor dysfunction) [Q88.72]  Referring Physician:  Irineo Crigler, MD MD Orders:  PT Eval and Treat   Return MD Appt:  2022  Date of Onset:  Onset Date:  (2 years ago)     Allergies:  Patient has no allergy information on record. Restrictions/Precautions:    No data recordedNo data recorded   Medications Last Reviewed:  2022     SUBJECTIVE     Updated Subjective: 22  Pt is working on relaxing her PFM during a spasm. She reports less leakage during spasms with relaxing her bladder. Bladder spasms: 2-3x/day, lasting 30-45 seconds. UI: Intermittent with coughing, sneezing, laughing. UI does NOT always occur with bladder spasm. Urinary frequency: 10/  PPD: at most 2PPD. 80% of time 1PPD      History of Injury/Illness (Reason for Referral):    Ms. Mitzi Gomes is a 57 yo female referred to pelvic floor physical therapy (PFPT) by Irineo Crigler, MD 2/2 PFD (pelvic floor dysfunction) [M62.89] Pt reports that symptoms began 2 years ago. She has had HAY for several years, but bladder spasms have been more recent. They are painful and last 30-45 seconds. States the spasms return until she empties her bladder.   She has been taking Vesicare since the fall which has helped. She has incontinence with sudden urge. This occurs with transitioning from OR to restroom. Frequent UTI's (2-3x/year) since menopause. She has not had a UTI in 1 year. She has not had a  Recent cystoscopy. Her last cystoscopy was 8 years ago without evident findings. 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     She is also taking metformin - She has loose stool. She is not sexually active. L/5 discectomy. Has felt sensory loss following. Urinary: Frequency 9-12 x/day, 2 x/night. She is trying to go every 2 hours. Positive for urge incontinence> stress incontinence. She used to experience bladder spasms all day. They are less frequent with medication. She has some relief with urination. Denies difficulty emptying her bladder. Pt does use pads for urinary protection; 2 pads per day (PPD). Fluid intake: 2 16 oz glasses of water/day; bladder irritants include: decaf coffee, white wine Pt does limit fluid intake due to bladder control while at work. Bowel: Frequency daily  Positive for  diarrhea with medication . Negative for pain with bowel movement and pushing/straining with bowel movement. Pt does not use pads for bowel protection; 0 pads per day (PPD). Sexual: Pt is not sexually active. Her  is impotent. Pelvic Organ Prolapse/Pelvic Pain: Denies pelvic pain. OB History: Number of pregnancies: Number of vaginal deliveries:  0Number of c-sections: 1 (twins)    Hemorrhoidectomy and fissure repair     Patient Stated Goal(s):  Minimize UI and bladder spasms. Initial:      0/10 Post Session:      0/10  Past Medical History/Comorbidities:   Ms. Abraham Tyson  has no past medical history on file. Ms. Abraham Tyson  has no past surgical history on file.      Social History/Living Environment:   Lives With: Spouse     Prior Level of Function/Work/Activity:   No data recordedNo data recordedNo data recorded   Learning:   Does the patient/guardian have any barriers to learning?: No barriers     Fall Risk Scale: Total Score: 0  Leach Fall Risk: Low (0-24)        Previous Treatment Approaches:  OAB medication    Personal Factors:        Age:  58 y.o. Profession:  Works in [place]. Pt working 4 ten hour shifts per week. She walks for exercises at work. She is working on weight loss by counting calories. She recently had a knee injection (seeing Dr. Ana Butterfield)      OBJECTIVE   External Observations:  Voluntary contraction: [] absent     [x] present  Involuntary contraction: [] absent     [x] present  Involuntary relaxation: [] absent     [x] present  Perineal descent at rest: [x] absent     [] present  Perineal descent with bearing: [] absent     [x] present  Postural assessment: Forward Head Posture and Increased Thoracic Kyphosis  Gait: Ambulating with cane. Pelvic Floor Muscle (PFM) Assessment:  Vaginal vault size: [] decreased     [] increased     [x] WNL  Muscle volume: [] decreased     [] WNL     [x] Defect  PFM tension: [] decreased     [x] increased     [] WNL    Contraction ability:  Voluntary contraction: [] absent     [x] weak     [x] moderate      [] strong  Voluntary relaxation: [] absent     [x] partial     [] complete   MMT: 3/5   Number of quick contractions in 10 seconds: 5  Quality of contractions: Fair to good  Overflow: [] absent     [] min     [] mod     [] severe / Compensatory mm groups include breath holding    Palpation: via vaginal canal   Superficial Pelvic Floor Musculature (PFM): Tender? Intermediate PFM Tender? Deep PFM Tender?    Superficial Transverse Perineal [] Right  [] Left  []DNT Deep Transverse Perineal [] Right  [] Left  []DNT Puborectalis [] Right  [] Left  []DNT   Ischiocavernosus [] Right  [] Left  []DNT   Compressor Urethra [x] Right  [x] Left  []DNT  Referral to bladder Pubococcygeus [] Right  [] Left  []DNT   Bulbocavernosus [] Right  [] Left  []DNT   Iliococcygeus [x] Right  [x] Left  []DNT Obturator Internus [] Right  [] Left  []DNT       Coccygeus [] Right  [] Left  []DNT     Strength: LE To be assessed. Range of motion:   Left Right   Hip flexion     Hip extension     Knee flexion      Knee extension     Hip internal rotation      Hip external rotation      Hip abduction     Hip adduction          External palpation:  Muscle/muscle group Tender? Adductors [] Right  [] Left  []DNT   Gluteals [] Right  [] Left  []DNT   Piriformis [] Right  [] Left  []DNT   Iliopsoas [] Right  [] Left  []DNT   Abdominal wall [x] Right  [x] Left  []DNT   Pubic symphysis [] Right  [] Left  []DNT     Breath assessment:  Observation: chest breathing dominant  Coordination of pelvic floor muscles with breath: no pelvic floor muscle excursion  Co-contraction of PFM with transversus abdominis: present    Diastasis Recti    At umbilicus Present   1 inch above umbilicus    1 inch below umbilicus    TA contraction        ASSESSMENT   Re-Assessment:   Yan Penn is progressing well in physical therapy, reporting improved control of bladder spasms during her workweek and on the weekends. She reports a reduction in intensity and frequency of bladder spasms as well as less bladder leakage. Her awareness and coordination of her PFM has improved. Her muscles are less tender to palpation. Her PFIQ score has reduced by 10 points since her initial evaluation. She does continue to present with PFM tension overactivity, likely linked to hip and back pain. She will continue to benefit from PFPT in order to reach the remainder of the goals listed below. Initial Assessment:  Rachna Mckeon presents with musculoskeletal limitations including pelvic floor muscle (PFM) tension, reduced PFM Range of Motion (ROM), increased tenderness to palpation of the PFM, altered body mechanics, reduced coordination and awareness of PFM and decreased pelvic floor muscle (PFM) strength.   These limitations are impairing the patient's ability to properly coordinate perineal elevation and descent for normalized PFM function, contributing to urinary dysfunction. As a result, she is limited in her/his ability to participate in physical activities such as walking, swimming, or other exercise, traveling by car or bus for a distance greater then 30 minutes away from home and perform job . Problem List: (Impacting functional limitations): Body Structures, Functions, Activity Limitations Requiring Skilled Therapeutic Intervention: Decreased ROM; Decreased body mechanics; Decreased tolerance to work activity; Decreased strength; Increased pain     Therapy Prognosis:   Therapy Prognosis: Good; Fair     Assessment Complexity:      PLAN   Effective Dates: 9/2/22 to 11/01/22     Frequency/Duration: Plan Frequency: weekly     Interventions Planned (Treatment may consist of any combination of the following):    Current Treatment Recommendations: Strengthening; ROM; Neuromuscular re-education; Manual Therapy - Soft Tissue Mobilization; Pain management; Home exercise program; Therapeutic activities     Goals: (Goals have been discussed and agreed upon with patient.)  Short-Term Functional Goals: Time Frame: 3-4 weeks  Pt will demonstrate I with basic PFM HEP to improve awareness, coordination, and timing of PFM. GOAL MET  Patient will demonstrate understanding of and ability to teach back appropriate water intake, bladder irritants, toileting frequency, and positioning for improved self-management of symptoms. Patient will demonstrate ability to isolate a pelvic floor contraction without breath holding and minimal to no accessory muscle use in order to implement the knack and/or urge suppression, reducing pad usage by 1/2.  WORKING TOWARDS  Patient will demonstrate appropriate use of the pelvic floor muscle group (quick flicks and/or drops), without compensation, to implement urge suppression appropriately with urgency of urination and decrease the number of pads per day or UI episodes by 1/2. WORKING TOWARDS  Patient will demonstrate appropriate co-contraction of the transversus abdominis and pelvic floor muscle group during exhalation in order to reduce IAP and improve functional task performance including lifting, bending, transitioning. GOAL MET  Discharge Goals: Time Frame: 4-8 weeks  Patient will demonstrate ability to voluntarily contract the pelvic floor muscles prior to a cough or sneeze for decreased leakage during increases in intra-abdominal pressure. GOAL MET  Patient will demonstrate independence with an advanced HEP for general conditioning, core stabilization, and mobility to facilitate carry over and independent management of symptoms. WORKING TOWARDS  Patient will be independent with implementation of a timed voiding schedule and use of urge suppression to reduce urinary frequency to 8-10/day and 1-2/night. WORKING TOWARDS           Outcome Measure:   PFIQ: 24/100  PFIQ at Re-Assessment: 14/100    Medical Necessity:   Patient is expected to demonstrate progress in strength, range of motion and coordination to improve urinary control. Skilled intervention continues to be required due to the above mentioned deficits. Reason For Services/Other Comments:  Patient continues to require skilled intervention due to the above mentioned deficits. Regarding Sapna Peace's therapy, I certify that the treatment plan above will be carried out by a therapist or under their direction.   Thank you for this referral,  [facility],      Referring Physician Signature: Tabby Langston MD _______________________________ Date _____________        Post Session Pain  Charge Capture   POC Link  Treatment Note Link  MD Guidelines  [facility]

## 2022-09-02 NOTE — PROGRESS NOTES
Christiano Monae  : 1960  Primary:   Secondary:  Ebonie Sanford  [address] [facility] [address]  Phone: [phone]  Fax: [phone] Plan Frequency: weekly    Plan of Care/Certification Expiration Date: 22      PT Visit Info: Total # of Visits to Date: 8  Progress Note Due Date: 22 (or 10th visit)      OUTPATIENT PHYSICAL THERAPY:OP NOTE TYPE: Treatment Note 2022       Episode  }Appt Desk              Treatment Diagnosis:  Lack of coordination (muscle incoordination) (R27.8)  Pelvic floor dysfunction in female (M62.98)  Urge incontinence (N39.41)  Muscle spasm (M62.83)  Medical/Referring Diagnosis:  PFD (pelvic floor dysfunction) [K20.59]  Referring Physician:  Franklyn Tong MD MD Orders:  PT Eval and Treat   Date of Onset:  Onset Date:  (2 years ago)     Allergies:   Patient has no allergy information on record. 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 She is not having bladder spasms as frequently. When her bladder does get full, she leaks. Urinary frequency at night - ever 4 hours. Daytime - 2.5 - 4 hours. 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 Pads per day: #4 pad at work, at most 2 pads. 6 am - 6 pm Leakage daily. Initial:}     0/10Post Session: 010      2 /10 vaginal discomfort  Medications Last Reviewed:  2022  Updated Objective Findings:      PFM contraction: 3/5 TrA/PFM  Quick flick contractions coordinated  moderate tension present through levator ani muscles.    Mild discomfort with palpation of her PFM using double digit.    Treatment   THERAPEUTIC EXERCISE: (10 minutes):    Supine hip abduction - isometric x 5 sec hold x 20 reps (BlueTB) - held  Isometric hip adduction with ball squeeze - x 10, 2 sets - held  Bridge x 10 - held  Coordinated PFM contractions: with digital cues, x 15 (PFM + TrA)  - completed   Quick flicks - completed    THERAPEUTIC ACTIVITY: (minutes): Functional activity education regarding anatomy, pathology and role of pelvic floor muscle (PFM) function in relation to presenting symptoms and role of pelvic floor therapy in conservative treatment., Functional activity education on avoiding bladder irritants, substitutions for common irritants, recommended fluid intake (types and spacing), appropriate voiding frequency, weight management and overall contributing factors of bowel health on bladder health/function in order to improve independent self management. Patient was provided a list of common bladder irritants including caffeine, carbonation, alcohol, artificial sweeteners, chocolate, acidic drinks, and tomato based drinks.  and Functional activity training on role and use of urge suppression in order to delay voiding, minimize urge urinary incontinence and improve control in the presence of urge triggers (ie. running water, key in lock, cold, etc.)    TA Educational Topic Notes Date Completed   Pathology/Anatomy/PFM Function Completed 6/6/22   Bladder health education Completed 6/6/22   Urinary urge suppression Completed  Reviewed - provided handout  Updated HEP to include PF drops and quick flicks  Reviewed 1/4/87  6/20/22  7/1/22  7/22/22  8/15/22   The knack     Voiding strategies     Nocturia tips     Bowel/Bladder log     Bowel health education     Constipation care     Diarrhea/Fecal leakage     Colonic massage     Toilet positioning     Defecation dynamics     Sources of fiber     Return to intercourse/Dilator training     Sexual positioning     Lubricants/vaginal moisturizers     Vaginal

## 2022-09-13 ENCOUNTER — HOSPITAL ENCOUNTER (OUTPATIENT)
Dept: PHYSICAL THERAPY | Age: 62
Setting detail: RECURRING SERIES
Discharge: HOME | End: 2022-09-16
Payer: COMMERCIAL

## 2022-09-13 PROCEDURE — 97530 THERAPEUTIC ACTIVITIES: CPT

## 2022-09-13 PROCEDURE — 97140 MANUAL THERAPY 1/> REGIONS: CPT

## 2022-09-13 PROCEDURE — 97110 THERAPEUTIC EXERCISES: CPT

## 2022-09-13 NOTE — PROGRESS NOTES
Sarthak Dyer  : 1960  Primary:   Secondary:  Neva Jenny  [address] [facility] [address]  Phone: [phone]  Fax: [phone] Plan Frequency: weekly    Plan of Care/Certification Expiration Date: 22      PT Visit Info: Total # of Visits to Date: 9  Progress Note Due Date: 22 (or 10th visit)      OUTPATIENT PHYSICAL THERAPY:OP NOTE TYPE: Treatment Note 2022       Episode  }Appt Desk              Treatment Diagnosis:  Lack of coordination (muscle incoordination) (R27.8)  Pelvic floor dysfunction in female (M62.98)  Urge incontinence (N39.41)  Muscle spasm (M62.83)  Medical/Referring Diagnosis:  PFD (pelvic floor dysfunction) [Y99.78]  Referring Physician:  Liyah Braden MD MD Orders:  PT Eval and Treat   Date of Onset:  Onset Date:  (2 years ago)     Allergies:   Patient has no allergy information on record. Restrictions/Precautions:  No data recordedNo data recorded   Interventions Planned (Treatment may consist of any combination of the following):    Current Treatment Recommendations: Strengthening; ROM; Neuromuscular re-education; Manual Therapy - Soft Tissue Mobilization; Pain management; Home exercise program; Therapeutic activities     Subjective Comments:  Pt had no bladder spasms or leakage with commute to the beach last weekend. However, yesterday she had a tough day at work with 3 spasms. She is able to control leakage better with drops. Initial:}     0/10Post Session: 0/10      2 /10 vaginal discomfort  Medications Last Reviewed:  2022  Updated Objective Findings:      PFM contraction: 3/5 TrA/PFM  Quick flick contractions coordinated  moderate tension present through levator ani muscles.        Treatment   THERAPEUTIC EXERCISE: (10 minutes):    Supine hip abduction - isometric x 5 sec hold x 20 reps (BlueTB) - held  Isometric hip adduction with ball squeeze - x 10, 2 sets - held  Bridge x 10 - held  Coordinated PFM contractions: with digital cues, x 15 (PFM + TrA)  - completed   Quick flicks - completed    THERAPEUTIC ACTIVITY: (15minutes): Functional activity education regarding anatomy, pathology and role of pelvic floor muscle (PFM) function in relation to presenting symptoms and role of pelvic floor therapy in conservative treatment., Functional activity education on avoiding bladder irritants, substitutions for common irritants, recommended fluid intake (types and spacing), appropriate voiding frequency, weight management and overall contributing factors of bowel health on bladder health/function in order to improve independent self management. Patient was provided a list of common bladder irritants including caffeine, carbonation, alcohol, artificial sweeteners, chocolate, acidic drinks, and tomato based drinks.  and Functional activity training on role and use of urge suppression in order to delay voiding, minimize urge urinary incontinence and improve control in the presence of urge triggers (ie. running water, key in lock, cold, etc.)    TA Educational Topic Notes Date Completed   Pathology/Anatomy/PFM Function Completed 6/6/22   Bladder health education Completed 6/6/22   Urinary urge suppression Completed  Reviewed - provided handout  Updated HEP to include PF drops and quick flicks  Reviewed 2/5/45  6/20/22  7/1/22  7/22/22  8/15/22   The knack     Voiding strategies     Nocturia tips     Bowel/Bladder log     Bowel health education     Constipation care     Diarrhea/Fecal leakage     Colonic massage     Toilet positioning     Defecation dynamics     Sources of fiber     Return to intercourse/Dilator training Home dilator use for PFM range of motion and tension reduction - sizing, selection, process of use, rationale 9/13/22   Sexual positioning     Lubricants/vaginal moisturizers     Vaginal irritants     Body mechanics     Posture/ergonomics     Diaphragmatic breathing Reviewed, drops with digital cues 6/20/22   Resources and technology       MANUAL THERAPY: (30 minutes): Soft tissue mobilization was utilized and necessary because of the patient's restricted motion of soft tissue. Date Type Location Comments   9/13/2022 Internal assessment/treatment Via vaginal canal SP, digital sweeping, and C/R - applied to superficial, intermediate, and deep layers of PFM     adductors CTM through adductors       CTM through abdominal wall, suprapubic/mons pubis                           (Used abbreviations: MET - muscle energy technique; SCS- Strain counter strain; CTM-Connective tissue mobilizations; CR- Contract/Relax; SP- Sustained pressure; PIT- Positional inhibition techniques; STM Soft -tissue mobilization; MM- Myofascial mobilization; TrP-Trigger point release; IASTM- Instrument assisted soft tissue mobilizations, TDN-Trigger point dry needling)    Pt gives verbal consent to internal vaginal assessment/treatment without chaperon present. Treatment/Session Summary:    Treatment Assessment:  Pt continues to report improved awareness of her PFM when returning to overactive state. She notices this while driving and at work. She continues to have difficulty controlling bladder spasms at work and often limits her water intake. We initiated discussion on home dilator in order to assist in maintaining PFM range of motion during her work week for improved carry over. She was agreeable to this plan and will look into purchasing a home dilator.       Communication/Consultation:  None today  Equipment provided today:  None  Recommendations/Intent for next treatment session: Next visit will continue manual therapy and global hip strengthening to assist in PFM support, review dilator use      Total Treatment Billable Duration:  55 minutes        [facility], PT       Charge Capture  }Post Session Pain  MedBridge Portal  MD Guidelines  Scanned Media  Benefits  MyChart    Future Appointments   Date Time Provider

## 2022-09-19 ENCOUNTER — HOSPITAL ENCOUNTER (OUTPATIENT)
Dept: PHYSICAL THERAPY | Age: 62
Setting detail: RECURRING SERIES
Discharge: HOME | End: 2022-09-22
Payer: COMMERCIAL

## 2022-09-19 PROCEDURE — 97530 THERAPEUTIC ACTIVITIES: CPT

## 2022-09-19 PROCEDURE — 97140 MANUAL THERAPY 1/> REGIONS: CPT

## 2022-09-19 NOTE — PROGRESS NOTES
Juanito Simpson  : 1960  Primary:   Secondary:  Poornima Garay  [address] [facility] [address]  Phone: [phone]  Fax: [phone] Plan Frequency: weekly    Plan of Care/Certification Expiration Date: 22      PT Visit Info: Total # of Visits to Date: 9  Progress Note Due Date: 22 (or 10th visit)      OUTPATIENT PHYSICAL THERAPY:OP NOTE TYPE: Treatment Note 2022       Episode  }Appt Desk              Treatment Diagnosis:  Lack of coordination (muscle incoordination) (R27.8)  Pelvic floor dysfunction in female (M62.98)  Urge incontinence (N39.41)  Muscle spasm (M62.83)  Medical/Referring Diagnosis:  PFD (pelvic floor dysfunction) [N13.51]  Referring Physician:  Scout Forrest MD MD Orders:  PT Eval and Treat   Date of Onset:  Onset Date:  (2 years ago)     Allergies:   Patient has no allergy information on record. Restrictions/Precautions:  No data recordedNo data recorded   Interventions Planned (Treatment may consist of any combination of the following):    Current Treatment Recommendations: Strengthening; ROM; Neuromuscular re-education; Manual Therapy - Soft Tissue Mobilization; Pain management; Home exercise program; Therapeutic activities     Subjective Comments:  Pt purchased dilator and lubricant. Pt reports reduction in bladder spasms. 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 Pt sleeping better through the night. Initial:}     0/10Post Session: 0/10      0 /10 vaginal discomfort  Medications Last Reviewed:  2022  Updated Objective Findings:      PFM contraction: 3/5 TrA/PFM  Quick flick contractions coordinated  Mild - moderate tension present through levator ani muscles.        Treatment   THERAPEUTIC EXERCISE: (minutes):    Supine hip abduction - isometric x 5 sec hold x 20 reps (BlueTB) - held  Isometric hip adduction with ball squeeze - x 10, 2 sets - held  Bridge x 10 - held  Coordinated PFM contractions: with digital cues, x 15 (PFM + TrA)  - completed   Quick flicks - completed    THERAPEUTIC ACTIVITY: (15 minutes): Functional activity education regarding anatomy, pathology and role of pelvic floor muscle (PFM) function in relation to presenting symptoms and role of pelvic floor therapy in conservative treatment., Functional activity education on avoiding bladder irritants, substitutions for common irritants, recommended fluid intake (types and spacing), appropriate voiding frequency, weight management and overall contributing factors of bowel health on bladder health/function in order to improve independent self management. Patient was provided a list of common bladder irritants including caffeine, carbonation, alcohol, artificial sweeteners, chocolate, acidic drinks, and tomato based drinks.  and Functional activity training on role and use of urge suppression in order to delay voiding, minimize urge urinary incontinence and improve control in the presence of urge triggers (ie. running water, key in lock, cold, etc.)    TA Educational Topic Notes Date Completed   Pathology/Anatomy/PFM Function Completed 6/6/22   Bladder health education Completed  Reviewed 6/6/22 9/19/22   Urinary urge suppression Completed  Reviewed - provided handout  Updated HEP to include PF drops and quick flicks  Reviewed - timed voiding  6/6/22  6/20/22  7/1/22  7/22/22  8/15/22  9/19/22   The knack     Voiding strategies     Nocturia tips     Bowel/Bladder log     Bowel health education     Constipation care     Diarrhea/Fecal leakage     Colonic massage     Toilet positioning     Defecation dynamics     Sources of fiber     Return to intercourse/Dilator training Home dilator use for PFM range of motion and tension reduction - sizing, selection, process of use, rationale 9/13/22   Sexual positioning     Lubricants/vaginal moisturizers Vaginal irritants     Body mechanics     Posture/ergonomics     Diaphragmatic breathing Reviewed, drops with digital cues 6/20/22   Resources and technology       MANUAL THERAPY: (40 minutes): Soft tissue mobilization was utilized and necessary because of the patient's restricted motion of soft tissue. Date Type Location Comments   9/19/2022 Internal assessment/treatment Via vaginal canal SP, digital sweeping, and C/R - applied to superficial, intermediate, and deep layers of PFM     adductors CTM through adductors       CTM through abdominal wall, suprapubic/mons pubis                           (Used abbreviations: MET - muscle energy technique; SCS- Strain counter strain; CTM-Connective tissue mobilizations; CR- Contract/Relax; SP- Sustained pressure; PIT- Positional inhibition techniques; STM Soft -tissue mobilization; MM- Myofascial mobilization; TrP-Trigger point release; IASTM- Instrument assisted soft tissue mobilizations, TDN-Trigger point dry needling)    Pt gives verbal consent to internal vaginal assessment/treatment without chaperon present. Treatment/Session Summary:    Treatment Assessment:  Pt with reduced muscle guarding today and improved ability to perform coordinated PFM through entire range of motion post manual therapy.       Communication/Consultation:  None today  Equipment provided today:  None  Recommendations/Intent for next treatment session: Next visit will continue manual therapy and global hip strengthening to assist in PFM support, review dilator use      Total Treatment Billable Duration:  55 minutes   Time In: 1005  Time Out: [address], PT       Charge Capture  }Post Session Pain  [facility] Portal  MD Guidelines  Scanned Media  Benefits  MyChart    Future Appointments   Date Time Provider Aaron Lees   9/30/2022 10:00 AM Amilcar Calzada, PT [facility]   10/3/2022  1:00 PM [facility]

## 2022-09-30 ENCOUNTER — HOSPITAL ENCOUNTER (OUTPATIENT)
Dept: PHYSICAL THERAPY | Age: 62
Setting detail: RECURRING SERIES
Discharge: HOME | End: 2022-10-03
Payer: COMMERCIAL

## 2022-09-30 PROCEDURE — 97110 THERAPEUTIC EXERCISES: CPT

## 2022-09-30 PROCEDURE — 97530 THERAPEUTIC ACTIVITIES: CPT

## 2022-09-30 PROCEDURE — 97140 MANUAL THERAPY 1/> REGIONS: CPT

## 2022-09-30 NOTE — PROGRESS NOTES
Adán Norris  : 1960  Primary:   Secondary:  Jovanna Ag  [address] [facility] [address]  Phone: [phone]  Fax: [phone] Plan Frequency: weekly    Plan of Care/Certification Expiration Date: 22      PT Visit Info: Total # of Visits to Date: 10  Progress Note Due Date: 22 (or 10th visit)      OUTPATIENT PHYSICAL THERAPY:OP NOTE TYPE: Treatment Note 2022       Episode  }Appt Desk              Treatment Diagnosis:  Lack of coordination (muscle incoordination) (R27.8)  Pelvic floor dysfunction in female (M62.98)  Urge incontinence (N39.41)  Muscle spasm (M62.83)  Medical/Referring Diagnosis:  PFD (pelvic floor dysfunction) [V11.30]  Referring Physician:  Nazia Ga MD MD Orders:  PT Eval and Treat   Date of Onset:  Onset Date:  (2 years ago)     Allergies:   Patient has no allergy information on record. Restrictions/Precautions:  No data recordedNo data recorded   Interventions Planned (Treatment may consist of any combination of the following):    Current Treatment Recommendations: Strengthening; ROM; Neuromuscular re-education; Manual Therapy - Soft Tissue Mobilization; Pain management; Home exercise program; Therapeutic activities     Subjective Comments:  Pt had 2 bad days this week she could not hold back urine. UI with ambulation. Pt without leakage sitting or standing in chair. States bladder spasms have significant reduced. Pt brought dilator to session today. Initial:}     0/10Post Session: 0/10      0 /10 vaginal discomfort  Medications Last Reviewed:  2022  Updated Objective Findings:      PFM contraction: 3/5 TrA/PFM  Quick flick contractions coordinated  Mild - moderate tension present through levator ani muscles. Blue dilator (Intimate esthela) - sharp pain present with insertion. Sustained stretch for <3 min due to pain.     Treatment   THERAPEUTIC EXERCISE: (10 minutes):    Supine hip abduction - isometric x 5 sec hold x 20 reps (BlueTB) - held  Isometric hip adduction with ball squeeze - x 10, 2 sets - held  Bridge x 10 - held  Coordinated PFM contractions: with digital cues, x 15 (PFM + TrA)  - completed   Quick flicks - completed  Supine active hip IR for ER lengthening x 10     THERAPEUTIC ACTIVITY: (15 minutes): Functional activity education regarding anatomy, pathology and role of pelvic floor muscle (PFM) function in relation to presenting symptoms and role of pelvic floor therapy in conservative treatment., Functional activity education on avoiding bladder irritants, substitutions for common irritants, recommended fluid intake (types and spacing), appropriate voiding frequency, weight management and overall contributing factors of bowel health on bladder health/function in order to improve independent self management. Patient was provided a list of common bladder irritants including caffeine, carbonation, alcohol, artificial sweeteners, chocolate, acidic drinks, and tomato based drinks.  and Functional activity training on role and use of urge suppression in order to delay voiding, minimize urge urinary incontinence and improve control in the presence of urge triggers (ie. running water, key in lock, cold, etc.)    TA Educational Topic Notes Date Completed   Pathology/Anatomy/PFM Function Completed  Reviewed 6/6/22 9/30/22   Bladder health education Completed  Reviewed 6/6/22 9/19/22   Urinary urge suppression Completed  Reviewed - provided handout  Updated HEP to include PF drops and quick flicks  Reviewed - timed voiding  6/6/22  6/20/22  7/1/22  7/22/22  8/15/22  9/19/22   The spencer     Voiding strategies     Nocturia tips     Bowel/Bladder log     Bowel health education     Constipation care     Diarrhea/Fecal leakage     Colonic massage     Toilet positioning     Defecation dynamics     Sources of fiber     Return to intercourse/Dilator training Home dilator use for PFM range of motion and tension reduction - sizing, selection, process of use, rationale  Dilator use - frequency, sizing 9/13/22 9/30/22   Sexual positioning     Lubricants/vaginal moisturizers     Vaginal irritants     Body mechanics     Posture/ergonomics     Diaphragmatic breathing Reviewed, drops with digital cues 6/20/22   Resources and technology       MANUAL THERAPY: (30 minutes): Soft tissue mobilization was utilized and necessary because of the patient's restricted motion of soft tissue. Date Type Location Comments   9/30/2022 Internal assessment/treatment Via vaginal canal SP, digital sweeping, and C/R - applied to superficial, intermediate, and deep layers of PFM     adductors CTM through adductors       CTM through abdominal wall, suprapubic/mons pubis - held today     Dilator size 6 5 min                     (Used abbreviations: MET - muscle energy technique; SCS- Strain counter strain; CTM-Connective tissue mobilizations; CR- Contract/Relax; SP- Sustained pressure; PIT- Positional inhibition techniques; STM Soft -tissue mobilization; MM- Myofascial mobilization; TrP-Trigger point release; IASTM- Instrument assisted soft tissue mobilizations, TDN-Trigger point dry needling)    Pt gives verbal consent to internal vaginal assessment/treatment without chaperon present. Treatment/Session Summary:    Treatment Assessment:  Initiated use of a dilator today to assist in maintaining range of motion in PFM post manual therapy and reducing global posterior chain tension. Pt had difficulty tolerating use of blue (size 6, intimate esthela dilator) due to tension and pain present. I recommended purchasing one size smaller before attempting home use. She continues to show improved coordination and isolation of her PFM with contractions. She was able to maintain a PFM contraction for >5 seconds.  However, PFM tension remains elevated and likely increases throughout her workday due to sustained pressure on the musculature and may cause fatigue.       Communication/Consultation:  None today  Equipment provided today:  None  Recommendations/Intent for next treatment session: Next visit will continue manual therapy and global hip strengthening to assist in PFM support, review dilator use      Total Treatment Billable Duration:  55 minutes   Time In: 1015  Time Out: Mary Ellen 20, PT       Charge Capture  }Post Session Pain  Med[facility] Portal  MD [address]    Future Appointments   Date Time Provider Aaron Lees   10/3/2022  1:00 PM [facility]

## 2022-10-03 ENCOUNTER — HOSPITAL ENCOUNTER (OUTPATIENT)
Dept: PHYSICAL THERAPY | Age: 62
Setting detail: RECURRING SERIES
Discharge: HOME | End: 2022-10-06
Payer: COMMERCIAL

## 2022-10-03 PROCEDURE — 97140 MANUAL THERAPY 1/> REGIONS: CPT

## 2022-10-03 NOTE — PROGRESS NOTES
Marito Ramos  : 1960  Primary:   Secondary:  Jerri Lopez  [address] [facility] [address]  Phone: [phone]  Fax: [phone] Plan Frequency: weekly    Plan of Care/Certification Expiration Date: 22      PT Visit Info: Total # of Visits to Date: 10  Progress Note Due Date: 22 (or 10th visit)      OUTPATIENT PHYSICAL THERAPY:OP NOTE TYPE: Treatment Note 10/3/2022       Episode  }Appt Desk              Treatment Diagnosis:  Lack of coordination (muscle incoordination) (R27.8)  Pelvic floor dysfunction in female (M62.98)  Urge incontinence (N39.41)  Muscle spasm (M62.83)  Medical/Referring Diagnosis:  PFD (pelvic floor dysfunction) [E34.03]  Referring Physician:  Ladan Yang MD MD Orders:  PT Eval and Treat   Date of Onset:  Onset Date:  (2 years ago)     Allergies:   Patient has no allergy information on record. Restrictions/Precautions:  No data recordedNo data recorded   Interventions Planned (Treatment may consist of any combination of the following):    Current Treatment Recommendations: Strengthening; ROM; Neuromuscular re-education; Manual Therapy - Soft Tissue Mobilization; Pain management; Home exercise program; Therapeutic activities     Subjective Comments:  Pt reports she felt discomfort following previous physical therapy appointment, very deep. Pt reports minimal to no leakage at home over the weekend. 5-6 hours sleep without waking up to urinate. Urinary frequency every 3-4 hours. Pt ordered one size smaller dilator. Initial:}     0/10Post Session: 0/10      0 /10 vaginal discomfort  Medications Last Reviewed:  10/3/2022  Updated Objective Findings:      PFM contraction: 3/5 TrA/PFM  Quick flick contractions coordinated  Mild - moderate tension present through levator ani muscles. Blue dilator (Intimate esthela) - moderate pain present with insertion. Sustained stretch for >5 min.  Improved ease with insertion. Treatment   THERAPEUTIC EXERCISE: (5 minutes):    Supine hip abduction - isometric x 5 sec hold x 20 reps (BlueTB) - held  Isometric hip adduction with ball squeeze - x 10, 2 sets - held  Bridge x 10 - held  Coordinated PFM contractions: with digital cues, x 15 (PFM + TrA)  - completed   Quick flicks - completed  Supine active hip IR for ER lengthening x 10   Reviewed HEP    THERAPEUTIC ACTIVITY: ( minutes): Functional activity education regarding anatomy, pathology and role of pelvic floor muscle (PFM) function in relation to presenting symptoms and role of pelvic floor therapy in conservative treatment., Functional activity education on avoiding bladder irritants, substitutions for common irritants, recommended fluid intake (types and spacing), appropriate voiding frequency, weight management and overall contributing factors of bowel health on bladder health/function in order to improve independent self management. Patient was provided a list of common bladder irritants including caffeine, carbonation, alcohol, artificial sweeteners, chocolate, acidic drinks, and tomato based drinks.  and Functional activity training on role and use of urge suppression in order to delay voiding, minimize urge urinary incontinence and improve control in the presence of urge triggers (ie. running water, key in lock, cold, etc.)    TA Educational Topic Notes Date Completed   Pathology/Anatomy/PFM Function Completed  Reviewed 6/6/22 9/30/22   Bladder health education Completed  Reviewed 6/6/22 9/19/22   Urinary urge suppression Completed  Reviewed - provided handout  Updated HEP to include PF drops and quick flicks  Reviewed - timed voiding  6/6/22  6/20/22  7/1/22  7/22/22  8/15/22  9/19/22   The spencer     Voiding strategies     Nocturia tips     Bowel/Bladder log     Bowel health education     Constipation care     Diarrhea/Fecal leakage     Colonic massage     Toilet positioning     Defecation dynamics     Sources of fiber     Return to intercourse/Dilator training Home dilator use for PFM range of motion and tension reduction - sizing, selection, process of use, rationale  Dilator use - frequency, sizing 9/13/22 9/30/22   Sexual positioning     Lubricants/vaginal moisturizers     Vaginal irritants     Body mechanics     Posture/ergonomics     Diaphragmatic breathing Reviewed, drops with digital cues 6/20/22   Resources and technology       MANUAL THERAPY: (45 minutes): Soft tissue mobilization was utilized and necessary because of the patient's restricted motion of soft tissue. Date Type Location Comments   10/3/2022 Internal assessment/treatment Via vaginal canal SP, digital sweeping, and C/R - applied to superficial, intermediate, and deep layers of PFM     adductors CTM through adductors       CTM through abdominal wall, suprapubic/mons pubis - held today     Dilator size 6 5 min                     (Used abbreviations: MET - muscle energy technique; SCS- Strain counter strain; CTM-Connective tissue mobilizations; CR- Contract/Relax; SP- Sustained pressure; PIT- Positional inhibition techniques; STM Soft -tissue mobilization; MM- Myofascial mobilization; TrP-Trigger point release; IASTM- Instrument assisted soft tissue mobilizations, TDN-Trigger point dry needling)    Pt gives verbal consent to internal vaginal assessment/treatment without chaperon present. Treatment/Session Summary:    Treatment Assessment:  Pt with improved tolerance to use of dilator today, reporting reduced pain and exhibiting less muscle guarding with insertion. Pt will continue to benefit from PFPT due to PFM overactivity present.       Communication/Consultation:  None today  Equipment provided today:  None  Recommendations/Intent for next treatment session: Next visit will continue manual therapy and global hip strengthening to assist in PFM support, review dilator use      Total Treatment Billable Duration:  50 minutes   Time In: 0105  Time Out: 0200    Saturnino Sep, PT       Charge Capture  }Post Session Pain  [email or web address] Portal  MD Guidelines  Scanned Media  Benefits  MyChart    No future appointments.

## 2022-10-11 ENCOUNTER — HOSPITAL ENCOUNTER (OUTPATIENT)
Dept: PHYSICAL THERAPY | Age: 62
Setting detail: RECURRING SERIES
Discharge: HOME | End: 2022-10-14
Payer: COMMERCIAL

## 2022-10-11 PROCEDURE — 97140 MANUAL THERAPY 1/> REGIONS: CPT

## 2022-10-11 ASSESSMENT — PAIN SCALES - GENERAL: PAINLEVEL_OUTOF10: 4

## 2022-10-11 NOTE — PROGRESS NOTES
Oneil Junior  : 1960  Primary:   Secondary:  Neil Choudhury  [address] [facility] [address]  Phone: [phone]  Fax: [phone] Plan Frequency: weekly    Plan of Care/Certification Expiration Date: 22      PT Visit Info: Total # of Visits to Date: 10  Progress Note Due Date: 22 (or 10th visit)      OUTPATIENT PHYSICAL THERAPY:OP NOTE TYPE: Treatment Note 10/11/2022       Episode  }Appt Desk              Treatment Diagnosis:  Lack of coordination (muscle incoordination) (R27.8)  Pelvic floor dysfunction in female (M62.98)  Urge incontinence (N39.41)  Muscle spasm (M62.83)  Medical/Referring Diagnosis:  PFD (pelvic floor dysfunction) [C22.92]  Referring Physician:  Alejandro Durbin MD MD Orders:  PT Eval and Treat   Date of Onset:  Onset Date:  (2 years ago)     Allergies:   Patient has no allergy information on record. Restrictions/Precautions:  No data recordedNo data recorded   Interventions Planned (Treatment may consist of any combination of the following):    Current Treatment Recommendations: Strengthening; ROM; Neuromuscular re-education; Manual Therapy - Soft Tissue Mobilization; Pain management; Home exercise program; Therapeutic activities     Subjective Comments:  Pt has not been taking OAB medication for 4 days. She is on vacation this week. She reports urinary frequency and urge to run to restroom over the weekend. She has been lifting heavy boxes. Pt reports no pain following dilator use after previous session. She received smaller dilator in the mail but has not tried using it yet. Initial:}    40/10Post Session: 010      0 /10 vaginal discomfort  Medications Last Reviewed:  10/11/2022  Updated Objective Findings:      PFM contraction: 3/5 TrA/PFM  Quick flick contractions coordinated  Mild - tension through right vaginal wall, moderate tension present through left vaginal walllevator ani muscles.    Green dilator (Intimate esthela size 5) - moderate pain present with insertion. Sustained stretch for >5 min. Improved ease with insertion. Treatment   THERAPEUTIC EXERCISE: ( minutes):    Supine hip abduction - isometric x 5 sec hold x 20 reps (BlueTB) - held  Isometric hip adduction with ball squeeze - x 10, 2 sets - held  Bridge x 10 - held  Coordinated PFM contractions: with digital cues, x 15 (PFM + TrA)  - completed   Quick flicks - completed  Supine active hip IR for ER lengthening x 10   Reviewed HEP    THERAPEUTIC ACTIVITY: ( minutes): Functional activity education regarding anatomy, pathology and role of pelvic floor muscle (PFM) function in relation to presenting symptoms and role of pelvic floor therapy in conservative treatment., Functional activity education on avoiding bladder irritants, substitutions for common irritants, recommended fluid intake (types and spacing), appropriate voiding frequency, weight management and overall contributing factors of bowel health on bladder health/function in order to improve independent self management. Patient was provided a list of common bladder irritants including caffeine, carbonation, alcohol, artificial sweeteners, chocolate, acidic drinks, and tomato based drinks.  and Functional activity training on role and use of urge suppression in order to delay voiding, minimize urge urinary incontinence and improve control in the presence of urge triggers (ie. running water, key in lock, cold, etc.)    TA Educational Topic Notes Date Completed   Pathology/Anatomy/PFM Function Completed  Reviewed 6/6/22 9/30/22   Bladder health education Completed  Reviewed 6/6/22 9/19/22   Urinary urge suppression Completed  Reviewed - provided handout  Updated HEP to include PF drops and quick flicks  Reviewed - timed voiding  6/6/22  6/20/22  7/1/22  7/22/22  8/15/22  9/19/22   The knack     Voiding strategies     Nocturia tips     Bowel/Bladder log     Bowel health education     Constipation care     Diarrhea/Fecal leakage     Colonic massage     Toilet positioning     Defecation dynamics     Sources of fiber     Return to intercourse/Dilator training Home dilator use for PFM range of motion and tension reduction - sizing, selection, process of use, rationale  Dilator use - frequency, sizing 9/13/22 9/30/22   Sexual positioning     Lubricants/vaginal moisturizers     Vaginal irritants     Body mechanics     Posture/ergonomics     Diaphragmatic breathing Reviewed, drops with digital cues 6/20/22   Resources and technology       MANUAL THERAPY: (55 minutes): Soft tissue mobilization was utilized and necessary because of the patient's restricted motion of soft tissue. Date Type Location Comments   10/11/2022 Internal assessment/treatment Via vaginal canal SP, digital sweeping, and C/R - applied to superficial, intermediate, and deep layers of PFM     adductors CTM through adductors       CTM through abdominal wall, suprapubic/mons pubis - held today     Dilator size 5 5 min                     (Used abbreviations: MET - muscle energy technique; SCS- Strain counter strain; CTM-Connective tissue mobilizations; CR- Contract/Relax; SP- Sustained pressure; PIT- Positional inhibition techniques; STM Soft -tissue mobilization; MM- Myofascial mobilization; TrP-Trigger point release; IASTM- Instrument assisted soft tissue mobilizations, TDN-Trigger point dry needling)    Pt gives verbal consent to internal vaginal assessment/treatment without chaperon present. Treatment/Session Summary:    Treatment Assessment:  Pt with mild restriction present with insertion of size 5 dilator (green intimate estheal). Pt was able to tolerate 5 minutes of vaginal penetration without muscle guarding during. Discomfort present with removal of dilator. Pt will continue to benefit from PFPT due to PFM overactivity present.       Communication/Consultation:  None today  Equipment provided today:  None  Recommendations/Intent for next treatment session: Next visit will continue manual therapy and global hip strengthening to assist in PFM support, sizes 5 & 5 dilator      Total Treatment Billable Duration:  55 minutes   Time In: 0105  Time Out: [address] [facility], PT       Charge Capture  }Post Session Pain  MedBridge Portal  MD Guidelines  Scanned Media  Benefits  MyChart    Future Appointments   Date Time Provider Aaron Lees   10/19/2022  9:00 AM Martinez leger PT [facility]   10/24/2022  3:00 PM Martinez leger PT [facility]   11/2/2022 11:00 AM Martinez leger PT [facility]

## 2022-10-19 ENCOUNTER — HOSPITAL ENCOUNTER (OUTPATIENT)
Dept: PHYSICAL THERAPY | Age: 62
Setting detail: RECURRING SERIES
Discharge: HOME | End: 2022-10-22
Payer: COMMERCIAL

## 2022-10-19 PROCEDURE — 97530 THERAPEUTIC ACTIVITIES: CPT

## 2022-10-19 PROCEDURE — 97140 MANUAL THERAPY 1/> REGIONS: CPT

## 2022-10-19 NOTE — PROGRESS NOTES
Ryland Prim  : 1960  Primary:   Secondary:  Chantelle Romero  [address] [facility] [address]  Phone: [phone]  Fax: [phone] Plan Frequency: weekly    Plan of Care/Certification Expiration Date: 22      PT Visit Info: Total # of Visits to Date: 10  Progress Note Due Date: 22 (or 10th visit)      OUTPATIENT PHYSICAL THERAPY:OP NOTE TYPE: Treatment Note 10/19/2022       Episode  }Appt Desk              Treatment Diagnosis:  Lack of coordination (muscle incoordination) (R27.8)  Pelvic floor dysfunction in female (M62.98)  Urge incontinence (N39.41)  Muscle spasm (M62.83)  Medical/Referring Diagnosis:  PFD (pelvic floor dysfunction) [K70.70]  Referring Physician:  Baldev Sewell MD MD Orders:  PT Eval and Treat   Date of Onset:  Onset Date:  (2 years ago)     Allergies:   Patient has no allergy information on record. Restrictions/Precautions:  No data recordedNo data recorded   Interventions Planned (Treatment may consist of any combination of the following):    Current Treatment Recommendations: Strengthening; ROM; Neuromuscular re-education; Manual Therapy - Soft Tissue Mobilization; Pain management; Home exercise program; Therapeutic activities     Subjective Comments:  Pt noticed significant difference in urinary leakage when she did not have medication. She had her script re-filled and has seen improvements since. Pt without pain/discomfort following      Initial:}     0/10Post Session: 0/10      0 /10 vaginal discomfort  Medications Last Reviewed:  10/19/2022  Updated Objective Findings:      PFM contraction: 3/5 TrA/PFM  Quick flick contractions coordinated  Green dilator (Intimate esthela size 5) - minimal discomfort with insertion, sustained pressure, and manipulation of dilator.      Treatment   THERAPEUTIC EXERCISE: ( minutes):    Supine hip abduction - isometric x 5 sec hold x 20 reps (BlueTB) - held  Isometric hip adduction with ball squeeze - x 10, 2 sets - held  Bridge x 10 - held  Coordinated PFM contractions: with digital cues, x 15 (PFM + TrA)  - completed   Quick flicks - completed  Supine active hip IR for ER lengthening x 10   Reviewed HEP    THERAPEUTIC ACTIVITY: ( minutes): Functional activity education regarding anatomy, pathology and role of pelvic floor muscle (PFM) function in relation to presenting symptoms and role of pelvic floor therapy in conservative treatment., Functional activity education on avoiding bladder irritants, substitutions for common irritants, recommended fluid intake (types and spacing), appropriate voiding frequency, weight management and overall contributing factors of bowel health on bladder health/function in order to improve independent self management. Patient was provided a list of common bladder irritants including caffeine, carbonation, alcohol, artificial sweeteners, chocolate, acidic drinks, and tomato based drinks.  and Functional activity training on role and use of urge suppression in order to delay voiding, minimize urge urinary incontinence and improve control in the presence of urge triggers (ie. running water, key in lock, cold, etc.)    TA Educational Topic Notes Date Completed   Pathology/Anatomy/PFM Function Completed  Reviewed 6/6/22 9/30/22   Bladder health education Completed  Reviewed 6/6/22 9/19/22   Urinary urge suppression Completed  Reviewed - provided handout  Updated HEP to include PF drops and quick flicks  Reviewed - timed voiding  6/6/22  6/20/22  7/1/22  7/22/22  8/15/22  9/19/22   The knack     Voiding strategies     Nocturia tips     Bowel/Bladder log     Bowel health education     Constipation care     Diarrhea/Fecal leakage     Colonic massage     Toilet positioning     Defecation dynamics     Sources of fiber     Return to intercourse/Dilator training Home dilator use for PFM range of motion and tension reduction - sizing, selection, process of use, rationale  Dilator use - frequency, sizing 9/13/22  9/30/22  10/19/22   Sexual positioning     Lubricants/vaginal moisturizers     Vaginal irritants     Body mechanics With digital cues     Posture/ergonomics     Diaphragmatic breathing Reviewed, drops with digital cues 6/20/22  10/19/22   Resources and technology       MANUAL THERAPY: (40 minutes): Soft tissue mobilization was utilized and necessary because of the patient's restricted motion of soft tissue. Date Type Location Comments   10/19/2022 Internal assessment/treatment Via vaginal canal SP, digital sweeping, and C/R - applied to superficial, intermediate, and deep layers of PFM  Release through natan-urethral tissue. adductors CTM through adductors       CTM through abdominal wall, suprapubic/mons pubis - held today     Dilator size 5 5 min                     (Used abbreviations: MET - muscle energy technique; SCS- Strain counter strain; CTM-Connective tissue mobilizations; CR- Contract/Relax; SP- Sustained pressure; PIT- Positional inhibition techniques; STM Soft -tissue mobilization; MM- Myofascial mobilization; TrP-Trigger point release; IASTM- Instrument assisted soft tissue mobilizations, TDN-Trigger point dry needling)    Pt gives verbal consent to internal vaginal assessment/treatment without chaperon present. Additional PT present in room - [facility]    Treatment/Session Summary:    Treatment Assessment:  Pt with improved tolerance to dilator size #5. She reported minimal to no discomfort with vaginal penetration, sustained pressure, and manipulation. She is improving significantly in her ability to maintain PFM relaxation/lengthening with pressure changes. Communication/Consultation:  None today  Equipment provided today:  None  Recommendations/Intent for next treatment session: Next visit will continue manual therapy, continue dilator size 5.        Total Treatment Billable Duration:  50 minutes   Time In: 1730  Time Out: [address], PT       Charge Capture  }Post Session Pain  MedBridge Portal  MD Guidelines  Scanned Media  Benefits  MyChart    Future Appointments   Date Time Provider Aaron Lees   10/24/2022  3:00 PM Leonard Romero [facility]   11/2/2022 11:00 AM Martinez leger PT [facility]

## 2022-10-24 ENCOUNTER — HOSPITAL ENCOUNTER (OUTPATIENT)
Dept: PHYSICAL THERAPY | Age: 62
Setting detail: RECURRING SERIES
Discharge: HOME | End: 2022-10-27
Payer: COMMERCIAL

## 2022-10-24 PROCEDURE — 97140 MANUAL THERAPY 1/> REGIONS: CPT

## 2022-10-24 PROCEDURE — 97110 THERAPEUTIC EXERCISES: CPT

## 2022-10-24 NOTE — PROGRESS NOTES
Evangelina Gonzales  : 1960  Primary:   Secondary:  Eloise Bence  [address] [facility] [address]  Phone: [phone]  Fax: [phone] Plan Frequency: weekly    Plan of Care/Certification Expiration Date: 22      PT Visit Info: Total # of Visits to Date: 6  Progress Note Due Date: 22 (or 10th visit)      OUTPATIENT PHYSICAL THERAPY:OP NOTE TYPE: Treatment Note 10/24/2022       Episode  }Appt Desk              Treatment Diagnosis:  Lack of coordination (muscle incoordination) (R27.8)  Pelvic floor dysfunction in female (M62.98)  Urge incontinence (N39.41)  Muscle spasm (M62.83)  Medical/Referring Diagnosis:  PFD (pelvic floor dysfunction) [D97.09]  Referring Physician:  Ezekiel Poole MD MD Orders:  PT Eval and Treat   Date of Onset:  Onset Date:  (2 years ago)     Allergies:   Patient has no allergy information on record.   Restrictions/Precautions:  No data recordedNo data recorded   Interventions Planned (Treatment may consist of any combination of the following):    Current Treatment Recommendations: Strengthening; ROM; Neuromuscular re-education; Manual Therapy - Soft Tissue Mobilization; Pain management; Home exercise program; Therapeutic activities     Subjective Comments:  See re-cert dated      Initial:}     0/10Post Session: 0/10      0 /10 vaginal discomfort  Medications Last Reviewed:  10/24/2022  Updated Objective Findings:    See re-cert dated 54/95/00    Treatment   THERAPEUTIC EXERCISE: (10 minutes):    Supine hip abduction - isometric x 5 sec hold x 20 reps (BlueTB) - held  Isometric hip adduction with ball squeeze - x 10, 2 sets - held  Bridge x 10 - held  Coordinated PFM contractions: with digital cues, x 15 (PFM + TrA)  - completed   Quick flicks - completed  Supine active hip IR for ER lengthening x 10 - held today  Reviewed HEP    THERAPEUTIC ACTIVITY: ( minutes): Functional activity education regarding anatomy, pathology and role of pelvic floor muscle (PFM) function in relation to presenting symptoms and role of pelvic floor therapy in conservative treatment., Functional activity education on avoiding bladder irritants, substitutions for common irritants, recommended fluid intake (types and spacing), appropriate voiding frequency, weight management and overall contributing factors of bowel health on bladder health/function in order to improve independent self management. Patient was provided a list of common bladder irritants including caffeine, carbonation, alcohol, artificial sweeteners, chocolate, acidic drinks, and tomato based drinks.  and Functional activity training on role and use of urge suppression in order to delay voiding, minimize urge urinary incontinence and improve control in the presence of urge triggers (ie. running water, key in lock, cold, etc.)    TA Educational Topic Notes Date Completed   Pathology/Anatomy/PFM Function Completed  Reviewed 6/6/22 9/30/22   Bladder health education Completed  Reviewed 6/6/22 9/19/22   Urinary urge suppression Completed  Reviewed - provided handout  Updated HEP to include PF drops and quick flicks  Reviewed - timed voiding  6/6/22  6/20/22  7/1/22  7/22/22  8/15/22  9/19/22   The knack     Voiding strategies     Nocturia tips     Bowel/Bladder log     Bowel health education     Constipation care     Diarrhea/Fecal leakage     Colonic massage     Toilet positioning     Defecation dynamics     Sources of fiber     Return to intercourse/Dilator training Home dilator use for PFM range of motion and tension reduction - sizing, selection, process of use, rationale  Dilator use - frequency, sizing 9/13/22  9/30/22  10/19/22   Sexual positioning     Lubricants/vaginal moisturizers     Vaginal irritants     Body mechanics With digital cues     Posture/ergonomics     Diaphragmatic breathing Reviewed, drops with digital cues 6/20/22  10/19/22   Resources and technology MANUAL THERAPY: (40 minutes): Soft tissue mobilization was utilized and necessary because of the patient's restricted motion of soft tissue. Date Type Location Comments   10/24/2022 Internal assessment/treatment Via vaginal canal SP, digital sweeping, and C/R - applied to superficial, intermediate, and deep layers of PFM  Release through natan-urethral tissue. adductors CTM through adductors       CTM through abdominal wall, suprapubic/mons pubis - held today     Dilator size 5 5 min - held today                     (Used abbreviations: MET - muscle energy technique; SCS- Strain counter strain; CTM-Connective tissue mobilizations; CR- Contract/Relax; SP- Sustained pressure; PIT- Positional inhibition techniques; STM Soft -tissue mobilization; MM- Myofascial mobilization; TrP-Trigger point release; IASTM- Instrument assisted soft tissue mobilizations, TDN-Trigger point dry needling)    Pt gives verbal consent to internal vaginal assessment/treatment without chaperon present.         Treatment/Session Summary:    Treatment Assessment:See re-certification     Communication/Consultation:  None today  Equipment provided today:  None  Recommendations/Intent for next treatment session: Next visit will continue manual therapy, continue dilator size 5, reduce frequency      Total Treatment Billable Duration:  50 minutes   Time In: 0310  Time Out: [address], PT       Charge Capture  }Post Session Pain  MedBridge Portal  MD Guidelines  Scanned Media  Benefits  MyChart    Future Appointments   Date Time Provider Aaron Lees   11/2/2022 11:00 AM Leonard Romero [facility]   12/2/2022 10:00 AM Martinez leger PT [facility]

## 2022-10-24 NOTE — THERAPY RECERTIFICATION
Erick Da Silva  : 1960  Primary: [facility] - Open Access (hmo)  Secondary:  [facility] [address]  [address] [address]  Phone: [phone]  Fax: [phone] Plan Frequency: weekly    Plan of Care/Certification Expiration Date: 22      PT Visit Info: Total # of Visits to Date: 6  Progress Note Due Date: 22 (or 10th visit)      OUTPATIENT PHYSICAL THERAPY:OP NOTE TYPE: Recertification                Episode  Appt Desk         Treatment Diagnosis:  Lack of coordination (muscle incoordination) (R27.8)  Pelvic floor dysfunction in female (M62.98)  Urge incontinence (N39.41)  Muscle spasm (M62.83)  * No diagnoses found *  Medical/Referring Diagnosis:  PFD (pelvic floor dysfunction) [C16.84]  Referring Physician:  Adalberto Munoz MD MD Orders:  PT Eval and Treat   Return MD Appt:  2022  Date of Onset:  Onset Date:  (2 years ago)     Allergies:  Patient has no allergy information on record. Restrictions/Precautions:    No data recordedNo data recorded   Medications Last Reviewed:  10/24/2022     SUBJECTIVE   Updated Subjective: 10/24/22  Bladder spasms: 2x/day, lasting 15-20 seconds  Reports UI with ambulating to commode in the evening. States this is a few drops versus full bladder loss. UI: UI with sneezing. Able to control leakage with first cough. With repetitive coughing, she is unable to control urine loss. PPD: 1-2 PPD. Never more than 2 pads. Pt able to insert dilator with pain/discomfort. Urinary frequency: 8-10x/day, every 2-3 hours. Updated Subjective: 22  Pt is working on relaxing her PFM during a spasm. She reports less leakage during spasms with relaxing her bladder. Bladder spasms: 2-3x/day, lasting 30-45 seconds. UI: Intermittent with coughing, sneezing, laughing. UI does NOT always occur with bladder spasm. Urinary frequency: 10/xday  PPD: at most 2PPD.  80% of time 1PPD      History of Injury/Illness (Reason for Referral):    Ms. Brett Johnson is a 59 yo female referred to pelvic floor physical therapy (PFPT) by Nisha Anderson MD 2/2 PFD (pelvic floor dysfunction) [M62.89] Pt reports that symptoms began 2 years ago. She has had HAY for several years, but bladder spasms have been more recent. They are painful and last 30-45 seconds. States the spasms return until she empties her bladder. She has been taking Vesicare since the fall which has helped. She has incontinence with sudden urge. This occurs with transitioning from OR to restroom. Frequent UTI's (2-3x/year) since menopause. She has not had a UTI in 1 year. She has not had a  Recent cystoscopy. Her last cystoscopy was 8 years ago without evident findings. 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     She is also taking metformin - She has loose stool. She is not sexually active. L/5 discectomy. Has felt sensory loss following. Urinary: Frequency 9-12 x/day, 2 x/night. She is trying to go every 2 hours. Positive for urge incontinence> stress incontinence. She used to experience bladder spasms all day. They are less frequent with medication. She has some relief with urination. Denies difficulty emptying her bladder. Pt does use pads for urinary protection; 2 pads per day (PPD). Fluid intake: 2 16 oz glasses of water/day; bladder irritants include: decaf coffee, white wine Pt does limit fluid intake due to bladder control while at work. Bowel: Frequency daily  Positive for  diarrhea with medication . Negative for pain with bowel movement and pushing/straining with bowel movement. Pt does not use pads for bowel protection; 0 pads per day (PPD). Sexual: Pt is not sexually active. Her  is impotent. Pelvic Organ Prolapse/Pelvic Pain: Denies pelvic pain.      OB History: Number of pregnancies: Number of vaginal deliveries:  0Number of c-sections: 1 (twins)    Hemorrhoidectomy and fissure repair     Patient Stated Goal(s):  Minimize UI and bladder spasms. Initial:      0/10 Post Session:      0/10  Past Medical History/Comorbidities:   Ms. Yosi De Jesus  has no past medical history on file. Ms. Yosi De Jesus  has no past surgical history on file. Social History/Living Environment:   Lives With: Spouse     Prior Level of Function/Work/Activity:   No data recordedNo data recordedNo data recorded   Learning:   Does the patient/guardian have any barriers to learning?: No barriers     Fall Risk Scale: Leach Total Score: 0  Leach Fall Risk: Low (0-24)        Previous Treatment Approaches:  OAB medication    Personal Factors:        Age:  58 y.o. Profession:  Works in [place]. Pt working 4 ten hour shifts per week. She walks for exercises at work. She is working on weight loss by counting calories. She recently had a knee injection (seeing Dr. Carr)      OBJECTIVE   External Observations:  Voluntary contraction: [] absent     [x] present  Involuntary contraction: [] absent     [x] present  Involuntary relaxation: [] absent     [x] present  Perineal descent at rest: [x] absent     [] present  Perineal descent with bearing: [] absent     [x] present  Postural assessment:  Forward Head Posture and Increased Thoracic Kyphosis  Gait: Ambulating without cane    Pelvic Floor Muscle (PFM) Assessment:  Vaginal vault size: [] decreased     [] increased     [x] WNL  Muscle volume: [] decreased     [] WNL     [x] Defect  PFM tension: [] decreased     [x] increased     [] WNL    Contraction ability:  Voluntary contraction: [] absent     [] weak     [x] moderate      [] strong  Voluntary relaxation: [] absent     [] partial     [x] complete   MMT: 3/5   Number of quick contractions in 10 seconds: 6  Quality of contractions: Good  Overflow: [] absent     [] min     [] mod     [] severe / Compensatory mm groups include none    Palpation: via vaginal canal   Superficial Pelvic Floor Musculature (PFM): Tender? Intermediate PFM Tender? Deep PFM Tender? Superficial Transverse Perineal [] Right  [] Left  []DNT Deep Transverse Perineal [] Right  [] Left  []DNT Puborectalis [] Right  [] Left  []DNT   Ischiocavernosus [] Right  [] Left  []DNT   Compressor Urethra [] Right  [] Left  [] Pubococcygeus [] Right  [] Left  []DNT   Bulbocavernosus [] Right  [] Left  []DNT   Iliococcygeus [x] Right  [x] Left  []DNT       Obturator Internus [] Right  [] Left  []DNT       Coccygeus [] Right  [] Left  []DNT     Strength: LE To be assessed. Range of motion:   Left Right   Hip flexion     Hip extension     Knee flexion      Knee extension     Hip internal rotation      Hip external rotation      Hip abduction     Hip adduction          External palpation:  Muscle/muscle group Tender? Adductors [] Right  [] Left  []DNT   Gluteals [] Right  [] Left  []DNT   Piriformis [] Right  [] Left  []DNT   Iliopsoas [] Right  [] Left  []DNT   Abdominal wall [] Right  [] Left  [x]DNT   Pubic symphysis [] Right  [] Left  []DNT     Breath assessment:  Observation: chest breathing dominant and A/P chest expansion  Coordination of pelvic floor muscles with breath: minimal pelvic floor muscle excursion  Co-contraction of PFM with transversus abdominis: present    Diastasis Recti    At umbilicus Present   1 inch above umbilicus    1 inch below umbilicus    TA contraction        ASSESSMENT   Re-Assessment:  Sonja Trinidad is progressing well in physical therapy, reported reduction in duration and frequency of bladder spasms, reduction in volume of urinary leakage, and improved control in the evenings. She has improved PFM range of motion, reduced pain/discomfort to palpation, of her PFM, and is able to insert a vaginal  independently. She will continue to benefit from PFPT in order to progress to a larger vaginal  to assist in PFM tension reduction and progress hip and core strengthening.        Re-Assessment:   Sonja Trinidad is progressing well in physical therapy, reporting improved control of bladder spasms during her workweek and on the weekends. She reports a reduction in intensity and frequency of bladder spasms as well as less bladder leakage. Her awareness and coordination of her PFM has improved. Her muscles are less tender to palpation. Her PFIQ score has reduced by 10 points since her initial evaluation. She does continue to present with PFM tension overactivity, likely linked to hip and back pain. She will continue to benefit from PFPT in order to reach the remainder of the goals listed below. Initial Assessment:  Agusto Porter presents with musculoskeletal limitations including pelvic floor muscle (PFM) tension, reduced PFM Range of Motion (ROM), increased tenderness to palpation of the PFM, altered body mechanics, reduced coordination and awareness of PFM and decreased pelvic floor muscle (PFM) strength. These limitations are impairing the patient's ability to properly coordinate perineal elevation and descent for normalized PFM function, contributing to urinary dysfunction. As a result, she is limited in her/his ability to participate in physical activities such as walking, swimming, or other exercise, traveling by car or bus for a distance greater then 30 minutes away from home and perform job . Problem List: (Impacting functional limitations): Body Structures, Functions, Activity Limitations Requiring Skilled Therapeutic Intervention: Decreased ROM; Decreased body mechanics; Decreased tolerance to work activity; Decreased strength;  Increased pain     Therapy Prognosis:   Therapy Prognosis: Good; Fair     Assessment Complexity:      PLAN   Effective Dates: 10/24/22 to 01/22/23      Frequency/Duration: bi-weekly      Interventions Planned (Treatment may consist of any combination of the following):    Current Treatment Recommendations: Strengthening; ROM; Neuromuscular re-education; Manual Therapy - Soft Tissue Mobilization; Pain management; Home exercise program; Therapeutic activities     Goals: (Goals have been discussed and agreed upon with patient.)  Short-Term Functional Goals: Time Frame: 3-4 weeks  Pt will demonstrate I with basic PFM HEP to improve awareness, coordination, and timing of PFM. GOAL MET  Patient will demonstrate understanding of and ability to teach back appropriate water intake, bladder irritants, toileting frequency, and positioning for improved self-management of symptoms. GOAL MET  Patient will demonstrate ability to isolate a pelvic floor contraction without breath holding and minimal to no accessory muscle use in order to implement the knack and/or urge suppression, reducing pad usage by 1/2. GOAL MET  Patient will demonstrate appropriate use of the pelvic floor muscle group (quick flicks and/or drops), without compensation, to implement urge suppression appropriately with urgency of urination and decrease the number of pads per day or UI episodes by 1/2. GOAL MET  Patient will demonstrate appropriate co-contraction of the transversus abdominis and pelvic floor muscle group during exhalation in order to reduce IAP and improve functional task performance including lifting, bending, transitioning. GOAL MET  Discharge Goals: Time Frame: 4-8 weeks  Patient will demonstrate ability to voluntarily contract the pelvic floor muscles prior to a cough or sneeze for decreased leakage during increases in intra-abdominal pressure. GOAL MET  Patient will demonstrate independence with an advanced HEP for general conditioning, core stabilization, and mobility to facilitate carry over and independent management of symptoms. WORKING TOWARDS  Patient will be independent with implementation of a timed voiding schedule and use of urge suppression to reduce urinary frequency to 8-10/day and 1-2/night.  WORKING TOWARDS  Patient will progress to next size dilator in order to assist in PFM range of motion and tension reduction, reducing bladder spasms to <3 days/week versus daily. Outcome Measure:   PFIQ: 24/100  PFIQ at Re-Assessment: 14/100  PFIQ at Re-Assessment #2: 0/100      Regarding Lauren Peace's therapy, I certify that the treatment plan above will be carried out by a therapist or under their direction.   Thank you for this referral,  Saturnino Kenney, PT     Referring Physician Signature: Carlos Mejía MD _______________________________ Date _____________        Post Session Pain  Charge Capture   POC Link  Treatment Note Link  MD Guidelines  MyChart

## 2022-11-29 NOTE — PROGRESS NOTES
I am accessing Ms. Peace's chart as a part of our department's internal chart auditing process. I certify that Ms. Hector Chavez is, or was, a patient in our department.   Thank you,  Rhett Swan, PT  11/29/2022

## 2022-12-02 ENCOUNTER — HOSPITAL ENCOUNTER (OUTPATIENT)
Dept: PHYSICAL THERAPY | Age: 62
Setting detail: RECURRING SERIES
Discharge: HOME | End: 2022-12-05
Payer: COMMERCIAL

## 2022-12-02 PROCEDURE — 97140 MANUAL THERAPY 1/> REGIONS: CPT

## 2022-12-02 NOTE — PROGRESS NOTES
Daxa Santo  : 1960  Primary:   Secondary:  [place]  [address] [facility] [address]  Phone: [phone]  Fax: [phone] Plan Frequency: weekly    Plan of Care/Certification Expiration Date: 22      PT Visit Info: Total # of Visits to Date: 6  Progress Note Due Date: 22 (or 10th visit)      OUTPATIENT PHYSICAL THERAPY:OP NOTE TYPE: Treatment Note 2022       Episode  }Appt Desk              Treatment Diagnosis:  Lack of coordination (muscle incoordination) (R27.8)  Pelvic floor dysfunction in female (M62.98)  Urge incontinence (N39.41)  Muscle spasm (M62.83)  Medical/Referring Diagnosis:  PFD (pelvic floor dysfunction) [B23.84]  Referring Physician:  Nisha Anderson MD MD Orders:  PT Eval and Treat   Date of Onset:  Onset Date:  (2 years ago)     Allergies:   Patient has no allergy information on record. Restrictions/Precautions:  No data recordedNo data recorded   Interventions Planned (Treatment may consist of any combination of the following):    Current Treatment Recommendations: Strengthening; ROM; Neuromuscular re-education; Manual Therapy - Soft Tissue Mobilization; Pain management; Home exercise program; Therapeutic activities     Subjective Comments:  Pt would like to try to have her hip replaced in the Spring due to consistent pain. Bladder has been doing well. She is down to 1 PPD. She has used the dilator just a couple of times due to difficulty making time to use these. She continues to void every 2-3 hours. Bladder spasms have lessened and better controlled. She has been consistent with breathing, hold contraction 2-3 seconds, quick flicks daily. She is paying attention to relaxing her PFM.       Initial:}     010Post Session: 010      0 /10 vaginal discomfort  Medications Last Reviewed:  2022  Updated Objective Findings:    Tender to palpation at B ischiocavernosus (R>L), iliococcygeus (R), and OI (R). Deep pressure described with palpation of iliococcygeus and OI. Palpation of ischiocavernosus produced bladder urgency. This muscle was also very taut. Treatment   THERAPEUTIC EXERCISE: (minutes):    Supine hip abduction - isometric x 5 sec hold x 20 reps (BlueTB) - held  Isometric hip adduction with ball squeeze - x 10, 2 sets - held  Bridge x 10 - held  Coordinated PFM contractions: with digital cues, x 15 (PFM + TrA)  - completed   Quick flicks - completed  Supine active hip IR for ER lengthening x 10 - held today  Reviewed HEP    THERAPEUTIC ACTIVITY: (10 minutes): Functional activity education regarding anatomy, pathology and role of pelvic floor muscle (PFM) function in relation to presenting symptoms and role of pelvic floor therapy in conservative treatment., Functional activity education on avoiding bladder irritants, substitutions for common irritants, recommended fluid intake (types and spacing), appropriate voiding frequency, weight management and overall contributing factors of bowel health on bladder health/function in order to improve independent self management. Patient was provided a list of common bladder irritants including caffeine, carbonation, alcohol, artificial sweeteners, chocolate, acidic drinks, and tomato based drinks.  and Functional activity training on role and use of urge suppression in order to delay voiding, minimize urge urinary incontinence and improve control in the presence of urge triggers (ie. running water, key in lock, cold, etc.)    TA Educational Topic Notes Date Completed   Pathology/Anatomy/PFM Function Completed  Reviewed 6/6/22 9/30/22   Bladder health education Completed  Reviewed 6/6/22 9/19/22   Urinary urge suppression Completed  Reviewed - provided handout  Updated HEP to include PF drops and quick flicks  Reviewed - timed voiding  6/6/22  6/20/22  7/1/22  7/22/22  8/15/22  9/19/22  15/8/59 drops, quick flicks, 3 sec holds with digital cues   The knack Voiding strategies     Nocturia tips     Bowel/Bladder log     Bowel health education     Constipation care     Diarrhea/Fecal leakage     Colonic massage     Toilet positioning     Defecation dynamics     Sources of fiber     Return to intercourse/Dilator training Home dilator use for PFM range of motion and tension reduction - sizing, selection, process of use, rationale  Dilator use - frequency, sizing 9/13/22  9/30/22  10/19/22   Sexual positioning     Lubricants/vaginal moisturizers     Vaginal irritants     Body mechanics With digital cues     Posture/ergonomics     Diaphragmatic breathing Reviewed, drops with digital cues 6/20/22  10/19/22  5 minutes   Resources and technology       MANUAL THERAPY: (40 minutes): Soft tissue mobilization was utilized and necessary because of the patient's restricted motion of soft tissue. Date Type Location Comments   12/2/2022 Internal assessment/treatment Via vaginal canal SP, digital sweeping, and C/R - applied to superficial, intermediate, and deep layers of PFM  Release through natan-urethral tissue. Sweeping at ischiocavernosus, B     adductors CTM through adductors, gluteals      CTM through abdominal wall, suprapubic/mons pubis - held today     Dilator size 5 5 min - held today                     (Used abbreviations: MET - muscle energy technique; SCS- Strain counter strain; CTM-Connective tissue mobilizations; CR- Contract/Relax; SP- Sustained pressure; PIT- Positional inhibition techniques; STM Soft -tissue mobilization; MM- Myofascial mobilization; TrP-Trigger point release; IASTM- Instrument assisted soft tissue mobilizations, TDN-Trigger point dry needling)    Pt gives verbal consent to internal vaginal assessment/treatment without chaperon present. Treatment/Session Summary:    Treatment Assessment: Beryl Cleveland has maintained good ROM in her PFM with consistent completion of her HEP. She is improving in her ability to independently manage symptoms.  She reports a reduction in pad use to 1PPD and improved urinary control. She does feel dilators are helping her. She has not progressed to the next dilator. We will f/u in 2-3 weeks to progress vaginal dilators and re-assess the PFM.       Communication/Consultation:  None today  Equipment provided today:  None  Recommendations/Intent for next treatment session: Next visit will continue manual therapy, continue dilator size 5, reduce frequency      Total Treatment Billable Duration:  50 minutes   Time In: 1000  Time Out: [address], PT       Charge Capture  }Post Session Pain  Med[facility] Portal  MD Guidelines  Scanned Media  Benefits  MyChart    Future Appointments   Date Time Provider Aaron Lees   12/20/2022  9:00 AM Donnie Castellano, PT [facility]

## 2022-12-20 ENCOUNTER — HOSPITAL ENCOUNTER (OUTPATIENT)
Dept: PHYSICAL THERAPY | Age: 62
Setting detail: RECURRING SERIES
Discharge: HOME | End: 2022-12-23
Payer: COMMERCIAL

## 2022-12-20 PROCEDURE — 97530 THERAPEUTIC ACTIVITIES: CPT

## 2022-12-20 PROCEDURE — 97140 MANUAL THERAPY 1/> REGIONS: CPT

## 2022-12-20 NOTE — PROGRESS NOTES
Lindaniyah Patricio  : 1960  Primary:   Secondary:  Connie Dowell  [address] [facility] [address]  Phone: [phone]  Fax: [phone] Plan Frequency: weekly    Plan of Care/Certification Expiration Date: 22      PT Visit Info: Total # of Visits to Date: 6  Progress Note Due Date: 22 (or 10th visit)      OUTPATIENT PHYSICAL THERAPY:OP NOTE TYPE: Treatment Note 2022       Episode  }Appt Desk              Treatment Diagnosis:  Lack of coordination (muscle incoordination) (R27.8)  Pelvic floor dysfunction in female (M62.98)  Urge incontinence (N39.41)  Muscle spasm (M62.83)  Medical/Referring Diagnosis:  PFD (pelvic floor dysfunction) [E32.97]  Referring Physician:  Landy Clark MD MD Orders:  PT Eval and Treat   Date of Onset:  Onset Date:  (2 years ago)     Allergies:   Patient has no allergy information on record. Restrictions/Precautions:  No data recordedNo data recorded   Interventions Planned (Treatment may consist of any combination of the following):    Current Treatment Recommendations: Strengthening; ROM; Neuromuscular re-education; Manual Therapy - Soft Tissue Mobilization; Pain management; Home exercise program; Therapeutic activities     Subjective Comments:  Pt scheduled hip replacement for May 8th. She is experiencing significant right hip pain, limiting her sitting tolerance. Her pain is located at her IT and extending into the groin. Pt feels her bladder is significantly improved. Exercises and medication seem to to be helping. Pt able to make it to restroom without leakage at night. She is down to 1 PPD. She has reduced absorbency of pad as well. She continues to void every 2-3 hours. Bladder spasms have lessened and better controlled. She has been consistent with breathing, hold contraction 2-3 seconds, quick flicks daily. She is paying attention to relaxing her PFM.       Initial:}    0 /10 Post Session: 0/10      0 /10 vaginal discomfort  Medications Last Reviewed:  12/20/2022  Updated Objective Findings:    See discharge summary     Treatment   THERAPEUTIC EXERCISE: (minutes):      THERAPEUTIC ACTIVITY: (10 minutes): Functional activity education regarding anatomy, pathology and role of pelvic floor muscle (PFM) function in relation to presenting symptoms and role of pelvic floor therapy in conservative treatment., Functional activity education on avoiding bladder irritants, substitutions for common irritants, recommended fluid intake (types and spacing), appropriate voiding frequency, weight management and overall contributing factors of bowel health on bladder health/function in order to improve independent self management. Patient was provided a list of common bladder irritants including caffeine, carbonation, alcohol, artificial sweeteners, chocolate, acidic drinks, and tomato based drinks.  and Functional activity training on role and use of urge suppression in order to delay voiding, minimize urge urinary incontinence and improve control in the presence of urge triggers (ie. running water, key in lock, cold, etc.)    TA Educational Topic Notes Date Completed   Pathology/Anatomy/PFM Function Completed  Reviewed 6/6/22 9/30/22   Bladder health education Completed  Reviewed 6/6/22 9/19/22   Urinary urge suppression Completed  Reviewed - provided handout  Updated HEP to include PF drops and quick flicks  Reviewed - timed voiding  6/6/22  6/20/22  7/1/22  7/22/22  8/15/22  9/19/22  65/1/70 drops, quick flicks, 3 sec holds with digital cues   The knack     Voiding strategies     Nocturia tips     Bowel/Bladder log     Bowel health education     Constipation care     Diarrhea/Fecal leakage     Colonic massage     Toilet positioning     Defecation dynamics     Sources of fiber     Return to intercourse/Dilator training Home dilator use for PFM range of motion and tension reduction - sizing, selection, process of use, rationale  Dilator use - frequency, sizing 9/13/22  9/30/22  10/19/22   Sexual positioning     Lubricants/vaginal moisturizers     Vaginal irritants     Body mechanics Coordination of breathing and PFM with transitions sit-to-stand and supine-to-sit  12/20/22   Posture/ergonomics     Diaphragmatic breathing Reviewed, drops with digital cues  Reviewed 6/20/22  10/19/22  5 minutes  12/2022   Resources and technology       MANUAL THERAPY: (45 minutes): Soft tissue mobilization was utilized and necessary because of the patient's restricted motion of soft tissue. Date Type Location Comments   12/20/2022 Internal assessment/treatment Via vaginal canal SP, digital sweeping, and C/R - applied to superficial, intermediate, and deep layers of PFM  Release through natan-urethral tissue. Sweeping at ischiocavernosus, B     STM Right gluteus medius, gluteus jamey, right hamstring group     CTM Right hamstring tendon at insertion to IT     Joint mobilization Caudal traction- acetabular femoral joint, gentle grade II                     (Used abbreviations: MET - muscle energy technique; SCS- Strain counter strain; CTM-Connective tissue mobilizations; CR- Contract/Relax; SP- Sustained pressure; PIT- Positional inhibition techniques; STM Soft -tissue mobilization; MM- Myofascial mobilization; TrP-Trigger point release; IASTM- Instrument assisted soft tissue mobilizations, TDN-Trigger point dry needling)    Pt gives verbal consent to internal vaginal assessment/treatment without chaperon present.     Treatment/Session Summary:    Treatment Assessment: See Discharge Summary     Communication/Consultation:  None today  Equipment provided today:  None  Recommendations/Intent for next treatment session: Discharge with HEP      Total Treatment Billable Duration:  55  Time In: 0903  Time Out: 1000    Akilah Jackson PT       Charge Capture  }Post Session Pain  [facility] Portal  MD Guidelines  Scanned Media  Benefits  MyChart    No future appointments.

## 2024-09-26 NOTE — THERAPY DISCHARGE
Judy Mail  : 1960  Primary: [facility] (hmo)  Secondary:  [facility] [address] [address]  Phone: [phone]  Fax: [phone] Plan Frequency: weekly    Plan of Care/Certification Expiration Date: 22      PT Visit Info: Total # of Visits to Date: 6  Progress Note Due Date: 22 (or 10th visit)      OUTPATIENT PHYSICAL THERAPY:OP NOTE TYPE: Discharge Summary 2022               Episode  Appt Desk         Treatment Diagnosis:  Lack of coordination (muscle incoordination) (R27.8)  Pelvic floor dysfunction in female (M62.98)  Urge incontinence (N39.41)  Muscle spasm (M62.83)  * No diagnoses found *  Medical/Referring Diagnosis:  PFD (pelvic floor dysfunction) [Y51.59]  Referring Physician:  Pito Harrison MD MD Orders:  PT Eval and Treat   Return MD Appt:  2022  Date of Onset:  Onset Date:  (2 years ago)     Allergies:  Patient has no allergy information on record. Restrictions/Precautions:    No data recordedNo data recorded   Medications Last Reviewed:  2022     SUBJECTIVE   Subjective at discharge: 22    Pt scheduled hip replacement for May 8th. She is experiencing significant right hip pain, limiting her sitting tolerance. Her pain is located at her IT and extending into the groin. Pt feels her bladder is significantly improved. Exercises and medication seem to to be helping. Pt able to make it to restroom without leakage at night. She is down to 1 PPD. She has reduced absorbency of pad as well. She continues to void every 2-3 hours. Bladder spasms have lessened and better controlled. She has been consistent with breathing, hold contraction 2-3 seconds, quick flicks daily. She is paying attention to relaxing her PFM. Updated Subjective: 22  Pt is working on relaxing her PFM during a spasm. She reports less leakage during spasms with relaxing her bladder. Bladder spasms: 2-3x/day, lasting 30-45 seconds. UI: Intermittent with coughing, sneezing, laughing. UI does NOT always occur with bladder spasm. Urinary frequency: 10/xday  PPD: at most 2PPD. 80% of time 1PPD      History of Injury/Illness (Reason for Referral):    Ms. Lloyd Lemos is a 57 yo female referred to pelvic floor physical therapy (PFPT) by Gorge Yusuf MD 2/2 PFD (pelvic floor dysfunction) [M62.89] Pt reports that symptoms began 2 years ago. She has had HAY for several years, but bladder spasms have been more recent. They are painful and last 30-45 seconds. States the spasms return until she empties her bladder. She has been taking Vesicare since the fall which has helped. She has incontinence with sudden urge. This occurs with transitioning from OR to restroom. Frequent UTI's (2-3x/year) since menopause. She has not had a UTI in 1 year. She has not had a  Recent cystoscopy. Her last cystoscopy was 8 years ago without evident findings. 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     She is also taking metformin - She has loose stool. She is not sexually active. L/5 discectomy. Has felt sensory loss following. Patient Stated Goal(s):  Minimize UI and bladder spasms. Initial:      0/10 Post Session:      0/10  Past Medical History/Comorbidities:   Ms. Lloyd Lemos  has no past medical history on file. Ms. Lloyd Lemos  has no past surgical history on file. Social History/Living Environment:   Lives With: Spouse     Prior Level of Function/Work/Activity:   No data recordedNo data recordedNo data recorded   Learning:   Does the patient/guardian have any barriers to learning?: No barriers     Fall Risk Scale: Leach Total Score: 0  Leach Fall Risk: Low (0-24)        Previous Treatment Approaches:  OAB medication    Personal Factors:        Age:  58 y.o. Profession:  Works in [employer]. Pt working 4 ten hour shifts per week.  She walks for exercises at work. She is working on [employer] loss by counting calories. She recently had a knee injection (seeing Dr. Carr)      OBJECTIVE   External Observations:  Voluntary contraction: [] absent     [x] present  Involuntary contraction: [] absent     [x] present  Involuntary relaxation: [] absent     [x] present  Perineal descent at rest: [x] absent     [] present  Perineal descent with bearing: [] absent     [x] present  Postural assessment: Forward Head Posture and Increased Thoracic Kyphosis  Gait: Ambulating with cane. Pelvic Floor Muscle (PFM) Assessment:  Vaginal vault size: [] decreased     [] increased     [x] WNL  Muscle volume: [] decreased     [] WNL     [x] Defect  PFM tension: [] decreased     [x] increased     [] WNL    Contraction ability:  Voluntary contraction: [] absent     [x] weak     [x] moderate      [] strong  Voluntary relaxation: [] absent     [x] partial     [] complete   MMT: 3/5   Number of quick contractions in 10 seconds: 5  Quality of contractions: Good  Overflow: [] absent     [] min     [] mod     [] severe / Compensatory mm groups include breath holding    Palpation: via vaginal canal   Superficial Pelvic Floor Musculature (PFM): Tender? Intermediate PFM Tender? Deep PFM Tender? Superficial Transverse Perineal [] Right  [] Left  []DNT Deep Transverse Perineal [] Right  [] Left  []DNT Puborectalis [] Right  [] Left  []DNT   Ischiocavernosus [] Right  [] Left  []DNT   Compressor Urethra [] Right  [] Left  []DNT   Pubococcygeus [] Right  [] Left  []DNT   Bulbocavernosus [] Right  [] Left  []DNT   Iliococcygeus [] Right  [] Left  []DNT       Obturator Internus [] Right  [] Left  []DNT       Coccygeus [] Right  [] Left  []DNT          External palpation:  Muscle/muscle group Tender?    Adductors [] Right  [] Left  []DNT   Gluteals [] Right  [] Left  []DNT   Piriformis [] Right  [] Left  []DNT   Iliopsoas [] Right  [] Left  []DNT   Abdominal wall [] Right  [] Left  []DNT   Pubic symphysis [] Right  [] Left  []DNT     Breath assessment:  Observation: chest breathing dominant and A/P chest expansion  Coordination of pelvic floor muscles with breath: moderate pelvic floor muscle excursion  Co-contraction of PFM with transversus abdominis: present    Diastasis Recti    At umbilicus Present   1 inch above umbilicus    1 inch below umbilicus    TA contraction        ASSESSMENT   DISCHARGE SUMMARY: Ms. Denny [address] has been seen in skilled PT from 6/6/22 to 12/20/22. Patient has attended 11 scheduled visits. Treatment has emphasized proprioceptive awareness, coordination, and strength of the PFM, bladder re-training, postural education, body mechanics training, and a home dilator program. Patient responded well to therapy, with improvements in urinary control, reducing pad use to 1 PPD or less, elimination of bladder spasms, and improved control making it the restroom with a strong urgency at night. Pt has achieved goals as indicated below and all questions have been answered to their satisfaction. Pt was invited to call with any further questions or concerns as needed. Re-Assessment:   Alyse Kraus is progressing well in physical therapy, reporting improved control of bladder spasms during her workweek and on the weekends. She reports a reduction in intensity and frequency of bladder spasms as well as less bladder leakage. Her awareness and coordination of her PFM has improved. Her muscles are less tender to palpation. Her PFIQ score has reduced by 10 points since her initial evaluation. She does continue to present with PFM tension overactivity, likely linked to hip and back pain. She will continue to benefit from PFPT in order to reach the remainder of the goals listed below.      Initial Assessment:  Julia De Souza presents with musculoskeletal limitations including pelvic floor muscle (PFM) tension, reduced PFM Range of Motion (ROM), increased tenderness to palpation of the PFM, altered body mechanics, reduced coordination and awareness of PFM and decreased pelvic floor muscle (PFM) strength. These limitations are impairing the patient's ability to properly coordinate perineal elevation and descent for normalized PFM function, contributing to urinary dysfunction. As a result, she is limited in her/his ability to participate in physical activities such as walking, swimming, or other exercise, traveling by car or bus for a distance greater then 30 minutes away from home and perform job . Problem List: (Impacting functional limitations): Body Structures, Functions, Activity Limitations Requiring Skilled Therapeutic Intervention: Decreased ROM; Decreased body mechanics; Decreased tolerance to work activity; Decreased strength; Increased pain     Therapy Prognosis:   Therapy Prognosis: Good; Fair     Assessment Complexity:      PLAN   Effective Dates: 9/2/22 to 11/01/22     Frequency/Duration: Plan Frequency: weekly     Interventions Planned (Treatment may consist of any combination of the following):    Current Treatment Recommendations: Strengthening; ROM; Neuromuscular re-education; Manual Therapy - Soft Tissue Mobilization; Pain management; Home exercise program; Therapeutic activities     Goals: (Goals have been discussed and agreed upon with patient.)  Short-Term Functional Goals: Time Frame: 3-4 weeks  Pt will demonstrate I with basic PFM HEP to improve awareness, coordination, and timing of PFM. GOAL MET  Patient will demonstrate understanding of and ability to teach back appropriate water intake, bladder irritants, toileting frequency, and positioning for improved self-management of symptoms. Patient will demonstrate ability to isolate a pelvic floor contraction without breath holding and minimal to no accessory muscle use in order to implement the knack and/or urge suppression, reducing pad usage by 1/2.  GOAL MET  Patient will demonstrate appropriate use of the pelvic floor muscle group (quick flicks and/or drops), without compensation, to implement urge suppression appropriately with urgency of urination and decrease the number of pads per day or UI episodes by 1/2. GOAL MET  Patient will demonstrate appropriate co-contraction of the transversus abdominis and pelvic floor muscle group during exhalation in order to reduce IAP and improve functional task performance including lifting, bending, transitioning. GOAL MET  Discharge Goals: Time Frame: 4-8 weeks  Patient will demonstrate ability to voluntarily contract the pelvic floor muscles prior to a cough or sneeze for decreased leakage during increases in intra-abdominal pressure. GOAL MET  Patient will demonstrate independence with an advanced HEP for general conditioning, core stabilization, and mobility to facilitate carry over and independent management of symptoms. GOAL MET  Patient will be independent with implementation of a timed voiding schedule and use of urge suppression to reduce urinary frequency to 8-10/day and 1-2/night. GOAL MET           Outcome Measure:   PFIQ: 24/100  PFIQ at Re-Assessment: 14/100  PFIQ at Discharge: [address] therapy, I certify that the treatment plan above will be carried out by a therapist or under their direction.   Thank you for this referral,  Saturnino Kenney PT     Referring Physician Signature: Carlos Mejía MD _______________________________ Date _____________        Post Session Pain  Charge Capture   POC Link  Treatment Note Link  MD Guidelines  MyChart 50